# Patient Record
Sex: FEMALE | Race: WHITE | Employment: UNEMPLOYED | URBAN - METROPOLITAN AREA
[De-identification: names, ages, dates, MRNs, and addresses within clinical notes are randomized per-mention and may not be internally consistent; named-entity substitution may affect disease eponyms.]

---

## 2017-01-06 DIAGNOSIS — M79.7 FIBROMYALGIA: ICD-10-CM

## 2017-01-09 ENCOUNTER — OFFICE VISIT (OUTPATIENT)
Dept: NEUROLOGY | Age: 37
End: 2017-01-09

## 2017-01-09 ENCOUNTER — HOSPITAL ENCOUNTER (EMERGENCY)
Age: 37
Discharge: HOME | End: 2017-01-09
Attending: EMERGENCY MEDICINE
Payer: MEDICARE

## 2017-01-09 VITALS
TEMPERATURE: 98 F | RESPIRATION RATE: 15 BRPM | OXYGEN SATURATION: 99 % | WEIGHT: 156.53 LBS | HEART RATE: 84 BPM | BODY MASS INDEX: 26.08 KG/M2 | DIASTOLIC BLOOD PRESSURE: 91 MMHG | HEIGHT: 65 IN | SYSTOLIC BLOOD PRESSURE: 135 MMHG

## 2017-01-09 DIAGNOSIS — F32.A DEPRESSION, UNSPECIFIED DEPRESSION TYPE: Primary | ICD-10-CM

## 2017-01-09 PROCEDURE — 90791 PSYCH DIAGNOSTIC EVALUATION: CPT

## 2017-01-09 PROCEDURE — 99284 EMERGENCY DEPT VISIT MOD MDM: CPT

## 2017-01-09 RX ORDER — IBUPROFEN 800 MG/1
TABLET ORAL
Qty: 90 TAB | Refills: 1 | OUTPATIENT
Start: 2017-01-09

## 2017-01-09 NOTE — DISCHARGE INSTRUCTIONS
Depression and Chronic Disease: Care Instructions  Your Care Instructions  A chronic disease is one that you have for a long time. Some chronic diseases can be controlled, but they usually cannot be cured. Depression is common in people with chronic diseases, but it often goes unnoticed. Many people have concerns about seeking treatment for a mental health problem. You may think it's a sign of weakness, or you don't want people to know about it. It's important to overcome these reasons for not seeking treatment. Treating depression or anxiety is good for your health. Follow-up care is a key part of your treatment and safety. Be sure to make and go to all appointments, and call your doctor if you are having problems. It's also a good idea to know your test results and keep a list of the medicines you take. How can you care for yourself at home? Watch for symptoms of depression  The symptoms of depression are often subtle at first. You may think they are caused by your disease rather than depression. Or you may think it is normal to be depressed when you have a chronic disease. If you are depressed you may:  · Feel sad or hopeless. · Feel guilty or worthless. · Not enjoy the things you used to enjoy. · Feel hopeless, as though life is not worth living. · Have trouble thinking or remembering. · Have low energy, and you may not eat or sleep well. · Pull away from others. · Think often about death or killing yourself. (Keep the numbers for these national suicide hotlines: 8-590-242-TALK [1-848.291.8947] and 5-510-FWHHZLD [1-349.466.5325]. )  Get treatment  By treating your depression, you can feel more hopeful and have more energy. If you feel better, you may take better care of yourself, so your health may improve. · Talk to your doctor if you have any changes in mood during treatment for your disease. · Ask your doctor for help.  Counseling, antidepressant medicine, or a combination of the two can help most people with depression. Often a combination works best. Counseling can also help you cope with having a chronic disease. When should you call for help? Call 911 anytime you think you may need emergency care. For example, call if:  · You feel like hurting yourself or someone else. · Someone you know has depression and is about to attempt or is attempting suicide. Call your doctor now or seek immediate medical care if:  · You hear voices. · Someone you know has depression and:  ¨ Starts to give away his or her possessions. ¨ Uses illegal drugs or drinks alcohol heavily. ¨ Talks or writes about death, including writing suicide notes or talking about guns, knives, or pills. ¨ Starts to spend a lot of time alone. ¨ Acts very aggressively or suddenly appears calm. Watch closely for changes in your health, and be sure to contact your doctor if:  · You do not get better as expected. Where can you learn more? Go to http://curtisZamplecate.info/. Enter R194 in the search box to learn more about \"Depression and Chronic Disease: Care Instructions. \"  Current as of: July 26, 2016  Content Version: 11.1  © 5055-3265 [employer]. Care instructions adapted under license by [employer] (which disclaims liability or warranty for this information). If you have questions about a medical condition or this instruction, always ask your healthcare professional. [place] [address] any warranty or liability for your use of this information. Preventing a Relapse of Depression: Care Instructions  Your Care Instructions  A relapse of depression means your symptoms have come back after you have gotten better. This illness often comes and goes during a lifetime. But there are many things you can do to keep it from coming back. Follow-up care is a key part of your treatment and safety.  Be sure to make and go to all appointments, and call your doctor if you are having problems. It's also a good idea to know your test results and keep a list of the medicines you take. What do you need to know? Know your risk of relapse  Talk to your doctor to find out if you are at risk of relapse. Many things can make a person more likely to relapse into depression. These include having a family member with depression, dealing with serious problems in a relationship or a job, having a serious medical condition, or abusing drugs or alcohol. It is important to know your risk and to recognize warning signs of relapse. Once you know these things, you will be better able to keep it from happening to you. Know the warning signs of relapse  The two most common signs of relapse are:  · Feeling sad or hopeless. · Losing interest in your daily activities. You may have other symptoms, such as:  · You lose or gain weight. · You sleep too much or not enough. · You feel restless and unable to sit still. · You feel unable to move. · You feel tired all the time. · You feel unworthy or guilty without an obvious reason. · You have problems concentrating, remembering, or making decisions. · You think often about death or suicide. · You feel angry or have panic attacks. How can you care for yourself at home? · Take your medicine as prescribed. Call your doctor if you have any problems with your medicine. Many people take their medicines for at least 6 months after they have recovered. This often helps keep symptoms from coming back. However, if your depression keeps coming back, you may have to take medicine for the rest of your life. · Continue counseling even after you have stopped taking medicine. · Eat healthy foods. Include fruits, vegetables, beans, and whole grains in your diet each day. · Get at least 30 minutes of exercise on most days of the week. Walking is a good choice.  You also may want to do other activities, such as running, swimming, cycling, or playing tennis or team sports. · See your doctor right away if you have new symptoms or feel that your depression is coming back. · Keep a regular sleep schedule. Try for 8 hours of sleep a night. · Avoid alcohol and illegal drugs. · Keep the numbers for these national suicide hotlines: 8-744-173-TALK (3-949.168.1412) and 3-569-JKCPVJZ (5-740.211.4693). If you or someone you know talks about suicide or feeling hopeless, get help right away. When should you call for help? Call 911 anytime you think you may need emergency care. For example, call if:  · You are thinking about suicide or are threatening suicide. · You feel you cannot stop from hurting yourself or someone else. · You hear or see things that aren't real.  · You think or speak in a bizarre way that is not like your usual behavior. Call your doctor now or seek immediate medical care if:  · You are drinking a lot of alcohol or using illegal drugs. · You are talking or writing about death. Watch closely for changes in your health, and be sure to contact your doctor if:  · You find it hard or it's getting harder to deal with school, a job, family, or friends. · You think your treatment is not helping or you are not getting better. · Your symptoms get worse or you get new symptoms. · You have any problems with your antidepressant medicines, such as side effects, or you are thinking about stopping your medicine. · You are having manic behavior, such as having very high energy, needing less sleep than normal, or showing risky behavior such as spending money you don't have or abusing others verbally or physically. Where can you learn more? Go to http://curtis-cate.info/. Enter Q929 in the search box to learn more about \"Preventing a Relapse of Depression: Care Instructions. \"  Current as of: July 26, 2016  Content Version: 11.1  © 5075-8294 [employer].  Care instructions adapted under license by [employer] (which disclaims liability or warranty for this information). If you have questions about a medical condition or this instruction, always ask your healthcare professional. [address] any warranty or liability for your use of this information. Recovering From Depression: Care Instructions  Your Care Instructions  Taking good care of yourself is important as you recover from depression. In time, your symptoms will fade as your treatment takes hold. Do not give up. Instead, focus your energy on getting better. Your mood will improve. It just takes some time. Focus on things that can help you feel better, such as being with friends and family, eating well, and getting enough rest. But take things slowly. Do not do too much too soon. You will begin to feel better gradually. Follow-up care is a key part of your treatment and safety. Be sure to make and go to all appointments, and call your doctor if you are having problems. It's also a good idea to know your test results and keep a list of the medicines you take. How can you care for yourself at home? Be realistic  · If you have a large task to do, break it up into smaller steps you can handle, and just do what you can. · You may want to put off important decisions until your depression has lifted. If you have plans that will have a major impact on your life, such as marriage, divorce, or a job change, try to wait a bit. Talk it over with friends and loved ones who can help you look at the overall picture first.  · Reaching out to people for help is important. Do not isolate yourself. Let your family and friends help you. Find someone you can trust and confide in, and talk to that person. · Be patient, and be kind to yourself. Remember that depression is not your fault and is not something you can overcome with willpower alone. Treatment is necessary for depression, just like for any other illness.  Feeling better takes time, and your mood will improve little by little. Stay active  · Stay busy and get outside. Take a walk, or try some other light exercise. · Talk with your doctor about an exercise program. Exercise can help with mild depression. · Go to a movie or concert. Take part in a Christianity activity or other social gathering. Go to a ball game. · Ask a friend to have dinner with you. Take care of yourself  · Eat a balanced diet with plenty of fresh fruits and vegetables, whole grains, and lean protein. If you have lost your appetite, eat small snacks rather than large meals. · Avoid drinking alcohol or using illegal drugs. Do not take medicines that have not been prescribed for you. They may interfere with medicines you may be taking for depression, or they may make your depression worse. · Take your medicines exactly as they are prescribed. You may start to feel better within 1 to 3 weeks of taking antidepressant medicine. But it can take as many as 6 to 8 weeks to see more improvement. If you have questions or concerns about your medicines, or if you do not notice any improvement by 3 weeks, talk to your doctor. · If you have any side effects from your medicine, tell your doctor. Antidepressants can make you feel tired, dizzy, or nervous. Some people have dry mouth, constipation, headaches, sexual problems, or diarrhea. Many of these side effects are mild and will go away on their own after you have been taking the medicine for a few weeks. Some may last longer. Talk to your doctor if side effects are bothering you too much. You might be able to try a different medicine. · Get enough sleep. If you have problems sleeping:  ¨ Go to bed at the same time every night, and get up at the same time every morning. ¨ Keep your bedroom dark and quiet. ¨ Do not exercise after 5:00 p.m. ¨ Avoid drinks with caffeine after 5:00 p.m. · Avoid sleeping pills unless they are prescribed by the doctor treating your depression.  Sleeping pills may make you groggy during the day, and they may interact with other medicine you are taking. · If you have any other illnesses, such as diabetes, heart disease, or high blood pressure, make sure to continue with your treatment. Tell your doctor about all of the medicines you take, including those with or without a prescription. · Keep the numbers for these national suicide hotlines: 0-969-144-TALK (0-449.463.8711) and 9-494-LQGYPPY (6-970.426.9581). If you or someone you know talks about suicide or feeling hopeless, get help right away. When should you call for help? Call 911 anytime you think you may need emergency care. For example, call if:  · You feel like hurting yourself or someone else. · Someone you know has depression and is about to attempt or is attempting suicide. Call your doctor now or seek immediate medical care if:  · You hear voices. · Someone you know has depression and:  ¨ Starts to give away his or her possessions. ¨ Uses illegal drugs or drinks alcohol heavily. ¨ Talks or writes about death, including writing suicide notes or talking about guns, knives, or pills. ¨ Starts to spend a lot of time alone. ¨ Acts very aggressively or suddenly appears calm. Watch closely for changes in your health, and be sure to contact your doctor if:  · You do not get better as expected. Where can you learn more? Go to http://curtis-cate.info/. Enter S350 in the search box to learn more about \"Recovering From Depression: Care Instructions. \"  Current as of: July 26, 2016  Content Version: 11.1  © 9221-4480 [employer]. Care instructions adapted under license by [employer] (which disclaims liability or warranty for this information). If you have questions about a medical condition or this instruction, always ask your healthcare professional. [address] any warranty or liability for your use of this information.          We hope that we have addressed all of your medical concerns. The examination and treatment you received in the Emergency Department were for an emergent problem and were not intended as complete care. It is important that you follow up with your healthcare provider(s) for ongoing care. If your symptoms worsen or do not improve as expected, and you are unable to reach your usual health care provider(s), you should return to the Emergency Department. Today's healthcare is undergoing tremendous change, and patient satisfaction surveys are one of the many tools to assess the quality of medical care. You may receive a survey from the [employer] regarding your experience in the Emergency Department. I hope that your experience has been completely positive, particularly the medical care that I provided. As such, please participate in the survey; anything less than excellent does not meet my expectations or intentions. [facility] and [address] [place] [address] participate in nationally recognized quality of care measures. If your blood pressure is greater than 120/80, as reported below, we urge that you seek medical care to address the potential of high blood pressure, commonly known as hypertension. Hypertension can be hereditary or can be caused by certain medical conditions, pain, stress, or \"white coat syndrome. \"       Please make an appointment with your health care provider(s) for follow up of your Emergency Department visit. VITALS:   Patient Vitals for the past 8 hrs:   Temp Pulse Resp BP SpO2   01/09/17 1536 - 75 - 120/83 -   01/09/17 1502 98 °F (36.7 °C) 89 18 139/87 99 %          Thank you for allowing us to provide you with medical care today. We realize that you have many choices for your emergency care needs. Please choose us in the future for any continued health care needs. Domenica Mclain NP    [employer]   Office: [phone]            No results found for this or any previous visit (from the past 24 hour(s)). No results found.

## 2017-01-09 NOTE — ED NOTES
Pt resting comfortably at this time. Awaiting Ghaad from [facility]. The pt voices no complaints at this time.

## 2017-01-09 NOTE — ED NOTES
The pt has been calm and cooperative. Pt has received follow-up plans from [address] [facility]. The pt is discharged home with parents. Pt denies suicidal and homicidal ideations.
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
## 2017-01-09 NOTE — ED NOTES
Pt placed in room 5 for direct observation. The pt and family (mother and father) report no suicidal issues at this time. Family at bedside.

## 2017-01-11 ENCOUNTER — OFFICE VISIT (OUTPATIENT)
Dept: INTERNAL MEDICINE CLINIC | Age: 37
End: 2017-01-11

## 2017-01-11 ENCOUNTER — DOCUMENTATION ONLY (OUTPATIENT)
Dept: INTERNAL MEDICINE CLINIC | Age: 37
End: 2017-01-11

## 2017-01-11 VITALS
OXYGEN SATURATION: 98 % | SYSTOLIC BLOOD PRESSURE: 132 MMHG | BODY MASS INDEX: 25.72 KG/M2 | HEART RATE: 83 BPM | TEMPERATURE: 98.8 F | WEIGHT: 154.4 LBS | RESPIRATION RATE: 20 BRPM | HEIGHT: 65 IN | DIASTOLIC BLOOD PRESSURE: 91 MMHG

## 2017-01-11 DIAGNOSIS — S06.9X0S TRAUMATIC BRAIN INJURY WITHOUT LOSS OF CONSCIOUSNESS, SEQUELA (HCC): Primary | ICD-10-CM

## 2017-01-11 DIAGNOSIS — F19.20 POLYSUBSTANCE DEPENDENCE (HCC): ICD-10-CM

## 2017-01-11 DIAGNOSIS — F25.0 SCHIZOAFFECTIVE DISORDER, MANIC TYPE (HCC): ICD-10-CM

## 2017-01-11 RX ORDER — SUMATRIPTAN 25 MG/1
TABLET, FILM COATED ORAL
Refills: 0 | COMMUNITY
Start: 2017-01-06 | End: 2018-09-25 | Stop reason: SDUPTHER

## 2017-01-11 RX ORDER — CYCLOBENZAPRINE HCL 10 MG
TABLET ORAL
Refills: 0 | COMMUNITY
Start: 2017-01-05 | End: 2019-09-30 | Stop reason: SDUPTHER

## 2017-01-11 NOTE — PROGRESS NOTES
HPI   Beather Barthel is a 44 y.o. female, she presents today for:    Prior patient seen by Dr. Ted Cruz.   Presents in follow-up to clinic after going to ER in place of neurology appointment on 1/9. Parents with concerns regarding risky behavior. History of hospitlaliztion. Not currently following with psychiatrist. In July of this year was advised to make this follow-up    Last admitted in May 2016 diagnosed with schizoaffective disorder  Referred to Dr. Baljinder Caal at [address] [place] [address] at that time  Discharged on depakote and risperdal.     Today in office mother reports she has been living with roommate, but wandering/walking outside without shoes on. Gloria Carlisle denies this. Gloria Dire states that she feels fuzzy in the head, thinks it may be related to her TBI. Also states she feels fine. Expresses desire to be independent in actions and not be confined to inside of the house. She is frustrated that her mother thinks there is anything wrong with her.    - has cutback/stopped smoking.    - she does admit that her mind is not where she would like to be be. PMH/PSH: reviewed and updated  Sochx:  reports that she has quit smoking. Her smoking use included Cigarettes. She does not have any smokeless tobacco history on file. She reports that she drinks alcohol. Famhx: reviewed and updated     All: Allergies   Allergen Reactions    Codeine Itching     Med:   Current Outpatient Prescriptions   Medication Sig    cyclobenzaprine (FLEXERIL) 10 mg tablet     SUMAtriptan (IMITREX) 25 mg tablet     ibuprofen (MOTRIN) 800 mg tablet take 1 tablet by mouth three times a day     No current facility-administered medications for this visit. Review of Systems   Constitutional: Negative for chills and fever. Respiratory: Negative for cough. Cardiovascular: Negative for chest pain. Gastrointestinal: Negative for nausea and vomiting. Skin: Negative for rash.    Neurological: Negative for headaches. Psychiatric/Behavioral: Negative for substance abuse and suicidal ideas. The patient has insomnia. PE:  Blood pressure (!) 132/91, pulse 83, temperature 98.8 °F (37.1 °C), temperature source Oral, resp. rate 20, height 5' 5\" (1.651 m), weight 154 lb 6.4 oz (70 kg), last menstrual period 12/26/2016, SpO2 98 %. Body mass index is 25.69 kg/(m^2). Physical Exam   Constitutional: She appears well-developed and well-nourished. HENT:   Head: Normocephalic. Right Ear: External ear normal.   Left Ear: External ear normal.   Eyes: Conjunctivae are normal. Pupils are equal, round, and reactive to light. Neck: Normal range of motion. Neck supple. No thyromegaly present. Cardiovascular: Normal rate and regular rhythm. Pulmonary/Chest: Effort normal and breath sounds normal.   Skin:   Skin on feet is healthy appearing, mild peeling around toes   Psychiatric:   Flattened affect, answering some questions. Nursing note and vitals reviewed. A/P:  44 y.o. female    ICD-10-CM ICD-9-CM    1. Traumatic brain injury without loss of consciousness, sequela ([facility] S06.9X0S 907.0    2. Polysubstance dependence ([address] F19.20 304.80    3. Schizoaffective disorder, manic type ([address] F25.0 295.70      Patient off medications for some time. With flattened affect, but no psychomotor agitation. With supportive family, no immediate harm to self. Tried to help patient frame seeking psychiatric care in a positive growth light and not just something her mother requires. - discussed that I would not want to restart a new medication regimen   - to follow-up with [facility] in the AM (lives in [place] [address]). - if behaviors become concerning for safety, to go to ER or call 911.      Appreciate assistance of CSW  30 minutes time spent with >50% in counseling and/or coordination of care
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
## 2017-01-11 NOTE — PROGRESS NOTES
Clinician met with patient regarding follow up on finding a psychiatric provider. Patient was taking her Depakote and mother reports that her symptoms improved. Patient reports that she did not feel any different and had negative GI side effects. Patient is now living independently and parents are concerned about patients mental health. Patient has an appointment with Gerardo Lehman in the morning in order to establish services. Clinician gave patient a list of psychiatric providers.

## 2017-01-17 DIAGNOSIS — M79.7 FIBROMYALGIA: ICD-10-CM

## 2017-01-17 RX ORDER — IBUPROFEN 800 MG/1
TABLET ORAL
Qty: 90 TAB | Refills: 1 | Status: CANCELLED | OUTPATIENT
Start: 2017-01-17

## 2017-01-17 RX ORDER — IBUPROFEN 600 MG/1
600 TABLET ORAL
Qty: 30 TAB | Refills: 0 | Status: SHIPPED | OUTPATIENT
Start: 2017-01-17 | End: 2017-03-20 | Stop reason: SDUPTHER

## 2017-04-05 ENCOUNTER — OFFICE VISIT (OUTPATIENT)
Dept: INTERNAL MEDICINE CLINIC | Age: 37
End: 2017-04-05

## 2017-04-05 ENCOUNTER — HOSPITAL ENCOUNTER (OUTPATIENT)
Dept: LAB | Age: 37
Discharge: HOME | End: 2017-04-05
Payer: MEDICARE

## 2017-04-05 VITALS
RESPIRATION RATE: 17 BRPM | SYSTOLIC BLOOD PRESSURE: 116 MMHG | HEART RATE: 82 BPM | WEIGHT: 158.2 LBS | OXYGEN SATURATION: 97 % | BODY MASS INDEX: 26.36 KG/M2 | DIASTOLIC BLOOD PRESSURE: 66 MMHG | HEIGHT: 65 IN | TEMPERATURE: 98.2 F

## 2017-04-05 DIAGNOSIS — S06.9X0S TRAUMATIC BRAIN INJURY WITHOUT LOSS OF CONSCIOUSNESS, SEQUELA (HCC): ICD-10-CM

## 2017-04-05 DIAGNOSIS — M54.2 CHRONIC NECK PAIN: Primary | ICD-10-CM

## 2017-04-05 DIAGNOSIS — F19.20 POLYSUBSTANCE DEPENDENCE (HCC): ICD-10-CM

## 2017-04-05 DIAGNOSIS — Z79.1 NSAID LONG-TERM USE: ICD-10-CM

## 2017-04-05 DIAGNOSIS — G89.29 CHRONIC NECK PAIN: Primary | ICD-10-CM

## 2017-04-05 DIAGNOSIS — M79.7 FIBROMYALGIA: ICD-10-CM

## 2017-04-05 DIAGNOSIS — F25.0 SCHIZOAFFECTIVE DISORDER, MANIC TYPE (HCC): ICD-10-CM

## 2017-04-05 PROCEDURE — 80048 BASIC METABOLIC PNL TOTAL CA: CPT

## 2017-04-05 RX ORDER — PANTOPRAZOLE SODIUM 20 MG/1
20 TABLET, DELAYED RELEASE ORAL DAILY
Qty: 60 TAB | Refills: 5 | Status: ON HOLD | OUTPATIENT
Start: 2017-04-05 | End: 2020-08-10

## 2017-04-05 RX ORDER — IBUPROFEN 600 MG/1
600 TABLET ORAL
Qty: 30 TAB | Refills: 1 | Status: SHIPPED | OUTPATIENT
Start: 2017-04-05 | End: 2018-09-26 | Stop reason: SDUPTHER

## 2017-04-05 NOTE — PROGRESS NOTES
RM 2    Chief Complaint   Patient presents with    Follow-up     medication    Physical    Medication Refill     PHQ 2 / 9, over the last two weeks 4/5/2017   Little interest or pleasure in doing things Not at all   Feeling down, depressed or hopeless Not at all   Total Score PHQ 2 0

## 2017-04-05 NOTE — PATIENT INSTRUCTIONS
Learning About How to Have a Healthy Back  What causes back pain? Back pain is often caused by overuse, strain, or injury. For example, people often hurt their backs playing sports or working in the yard, being jolted in a car accident, or lifting something too heavy. Aging plays a part too. Your bones and muscles tend to lose strength as you age, which makes injury more likely. The spongy discs between the bones of the spine (vertebrae) may suffer from wear and tear and no longer provide enough cushion between the bones. A disc that bulges or breaks open (herniated disc) can press on nerves, causing back pain. In some people, back pain is the result of arthritis, broken vertebrae caused by bone loss (osteoporosis), illness, or a spine problem. Although most people have back pain at one time or another, there are steps you can take to make it less likely. How can you have a healthy back? Reduce stress on your back through good posture  Slumping or slouching alone may not cause low back pain. But after the back has been strained or injured, bad posture can make pain worse. · Sleep in a position that maintains your back's normal curves and on a mattress that feels comfortable. Sleep on your side with a pillow between your knees, or sleep on your back with a pillow under your knees. These positions can reduce strain on your back. · Stand and sit up straight. \"Good posture\" generally means your ears, shoulders, and hips are in a straight line. · If you must stand for a long time, put one foot on a stool, ledge, or box. Switch feet every now and then. · Sit in a chair that is low enough to let you place both feet flat on the floor with both knees nearly level with your hips. If your chair or desk is too high, use a footrest to raise your knees. Place a small pillow, a rolled-up towel, or a lumbar roll in the curve of your back if you need extra support.   · Try a kneeling chair, which helps tilt your hips forward. This takes pressure off your lower back. · Try sitting on an exercise ball. It can rock from side to side, which helps keep your back loose. · When driving, keep your knees nearly level with your hips. Sit straight, and drive with both hands on the steering wheel. Your arms should be in a slightly bent position. Reduce stress on your back through careful lifting  · Squat down, bending at the hips and knees only. If you need to, put one knee to the floor and extend your other knee in front of you, bent at a right angle (half kneeling). · Press your chest straight forward. This helps keep your upper back straight while keeping a slight arch in your low back. · Hold the load as close to your body as possible, at the level of your belly button (navel). · Use your feet to change direction, taking small steps. · Lead with your hips as you change direction. Keep your shoulders in line with your hips as you move. · Set down your load carefully, squatting with your knees and hips only. Exercise and stretch your back  · Do some exercise on most days of the week, if your doctor says it is okay. You can walk, run, swim, or cycle. · Stretch your back muscles. Here are a few exercises to try:  Mila Gunning on your back, and gently pull one bent knee to your chest. Put that foot back on the floor, and then pull the other knee to your chest.  ¨ Do pelvic tilts. Lie on your back with your knees bent. Tighten your stomach muscles. Pull your belly button (navel) in and up toward your ribs. You should feel like your back is pressing to the floor and your hips and pelvis are slightly lifting off the floor. Hold for 6 seconds while breathing smoothly. ¨ Sit with your back flat against a wall. · Keep your core muscles strong. The muscles of your back, belly (abdomen), and buttocks support your spine. ¨ Pull in your belly and imagine pulling your navel toward your spine. Hold this for 6 seconds, then relax.  Remember to keep breathing normally as you tense your muscles. ¨ Do curl-ups. Always do them with your knees bent. Keep your low back on the floor, and curl your shoulders toward your knees using a smooth, slow motion. Keep your arms folded across your chest. If this bothers your neck, try putting your hands behind your neck (not your head), with your elbows spread apart. ¨ Lie on your back with your knees bent and your feet flat on the floor. Tighten your belly muscles, and then push with your feet and raise your buttocks up a few inches. Hold this position 6 seconds as you continue to breathe normally, then lower yourself slowly to the floor. Repeat 8 to 12 times. ¨ If you like group exercise, try Pilates or yoga. These classes have poses that strengthen the core muscles. Lead a healthy lifestyle  · Stay at a healthy weight to avoid strain on your back. · Do not smoke. Smoking increases the risk of osteoporosis, which weakens the spine. If you need help quitting, talk to your doctor about stop-smoking programs and medicines. These can increase your chances of quitting for good. Where can you learn more? Go to http://curtis-cate.info/. Enter L315 in the search box to learn more about \"Learning About How to Have a Healthy Back. \"  Current as of: May 23, 2016  Content Version: 11.2  © 2027-2367 [employer]. Care instructions adapted under license by [employer] (which disclaims liability or warranty for this information). If you have questions about a medical condition or this instruction, always ask your healthcare professional. [place] [address] any warranty or liability for your use of this information.
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
## 2017-04-05 NOTE — MR AVS SNAPSHOT
Visit Information Date & Time Provider Department Dept. Phone Encounter #  
 4/5/2017  1:30 PM Lashae Caraballo MD [facility] and Internal [facility] [phone] [id number] Follow-up Instructions Return in about 1 month (around 5/5/2017). Upcoming Health Maintenance Date Due DTaP/Tdap/Td series (1 - Tdap) 1/5/2000 PAP AKA CERVICAL CYTOLOGY 1/5/2000 INFLUENZA AGE 9 TO ADULT 8/1/2016 Allergies as of 4/5/2017  Review Complete On: 4/5/2017 By: Rebecca Hussein Severity Noted Reaction Type Reactions Codeine  08/25/2015    Itching Current Immunizations  Never Reviewed No immunizations on file. Not reviewed this visit You Were Diagnosed With   
  
 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 
ICD-9-CM: 907.0 Fibromyalgia     ICD-10-CM: M79.7 ICD-9-CM: 729.1 NSAID long-term use     ICD-10-CM: Z79.1 ICD-9-CM: V58.64 Vitals BP Pulse Temp Resp Height(growth percentile) Weight(growth percentile) 116/66 82 98.2 °F (36.8 °C) (Oral) 17 5' 5\" (1.651 m) 158 lb 3.2 oz (71.8 kg) LMP SpO2 BMI OB Status Smoking Status 03/25/2017 97% 26.33 kg/m2 Having regular periods Former Smoker Vitals History BMI and BSA Data Body Mass Index Body Surface Area  
 26.33 kg/m 2 1.81 m 2 Preferred Pharmacy Pharmacy Name Phone [facility] - Sylvia Mendoza, [address] [place] [address] Judy Marsh [phone] Your Updated Medication List  
  
   
This list is accurate as of: 4/5/17  2:31 PM.  Always use your most recent med list.  
  
  
  
  
 ARIPiprazole 2 mg tablet Commonly known as:  ABILIFY  
take 1 tablet by mouth every morning  
  
 cyclobenzaprine 10 mg tablet Commonly known as:  FLEXERIL  
  
 ibuprofen 600 mg tablet Commonly known as:  MOTRIN  
 Take 1 Tab by mouth every six (6) hours as needed for Pain.  
  
 pantoprazole 20 mg tablet Commonly known as:  PROTONIX Take 1 Tab by mouth daily. SUMAtriptan 25 mg tablet Commonly known as:  IMITREX Prescriptions Sent to Pharmacy Refills  
 ibuprofen (MOTRIN) 600 mg tablet 1 Sig: Take 1 Tab by mouth every six (6) hours as needed for Pain. Class: Normal  
 Pharmacy: [address], [address] [address]  #: [phone] Route: Oral  
 pantoprazole (PROTONIX) 20 mg tablet 5 Sig: Take 1 Tab by mouth daily. Class: Normal  
 Pharmacy: [address], [address] Ph #: [phone] Route: Oral  
  
We Performed the Following METABOLIC PANEL, BASIC [59637 CPT(R)] REFERRAL TO PHYSICIAL MEDICINE REHAB [KNC30 Custom] Comments:  
 Please evaluate patient for chronic neck pain with numbness in hands. Follow-up Instructions Return in about 1 month (around 5/5/2017). Referral Information Referral ID Referred By Referred To  
  
 [id number] Paula Lee MD   
   [facility], [address] [place] [address] Avenue Phone: [phone] Fax: [phone] Visits Status Start Date End Date 1 New Request 4/5/17 4/5/18 If your referral has a status of pending review or denied, additional information will be sent to support the outcome of this decision. Patient Instructions Learning About How to Have a Healthy Back What causes back pain? Back pain is often caused by overuse, strain, or injury. For example, people often hurt their backs playing sports or working in the yard, being jolted in a car accident, or lifting something too heavy. Aging plays a part too. Your bones and muscles tend to lose strength as you age, which makes injury more likely.  The spongy discs between the bones of the spine (vertebrae) may suffer from wear and tear and no longer provide enough cushion between the bones. A disc that bulges or breaks open (herniated disc) can press on nerves, causing back pain. In some people, back pain is the result of arthritis, broken vertebrae caused by bone loss (osteoporosis), illness, or a spine problem. Although most people have back pain at one time or another, there are steps you can take to make it less likely. How can you have a healthy back? Reduce stress on your back through good posture Slumping or slouching alone may not cause low back pain. But after the back has been strained or injured, bad posture can make pain worse. · Sleep in a position that maintains your back's normal curves and on a mattress that feels comfortable. Sleep on your side with a pillow between your knees, or sleep on your back with a pillow under your knees. These positions can reduce strain on your back. · Stand and sit up straight. \"Good posture\" generally means your ears, shoulders, and hips are in a straight line. · If you must stand for a long time, put one foot on a stool, ledge, or box. Switch feet every now and then. · Sit in a chair that is low enough to let you place both feet flat on the floor with both knees nearly level with your hips. If your chair or desk is too high, use a footrest to raise your knees. Place a small pillow, a rolled-up towel, or a lumbar roll in the curve of your back if you need extra support. · Try a kneeling chair, which helps tilt your hips forward. This takes pressure off your lower back. · Try sitting on an exercise ball. It can rock from side to side, which helps keep your back loose. · When driving, keep your knees nearly level with your hips. Sit straight, and drive with both hands on the steering wheel. Your arms should be in a slightly bent position. Reduce stress on your back through careful lifting · Squat down, bending at the hips and knees only. If you need to, put one knee to the floor and extend your other knee in front of you, bent at a right angle (half kneeling). · Press your chest straight forward. This helps keep your upper back straight while keeping a slight arch in your low back. · Hold the load as close to your body as possible, at the level of your belly button (navel). · Use your feet to change direction, taking small steps. · Lead with your hips as you change direction. Keep your shoulders in line with your hips as you move. · Set down your load carefully, squatting with your knees and hips only. Exercise and stretch your back · Do some exercise on most days of the week, if your doctor says it is okay. You can walk, run, swim, or cycle. · Stretch your back muscles. Here are a few exercises to try: ¨ Lie on your back, and gently pull one bent knee to your chest. Put that foot back on the floor, and then pull the other knee to your chest. 
¨ Do pelvic tilts. Lie on your back with your knees bent. Tighten your stomach muscles. Pull your belly button (navel) in and up toward your ribs. You should feel like your back is pressing to the floor and your hips and pelvis are slightly lifting off the floor. Hold for 6 seconds while breathing smoothly. ¨ Sit with your back flat against a wall. · Keep your core muscles strong. The muscles of your back, belly (abdomen), and buttocks support your spine. ¨ Pull in your belly and imagine pulling your navel toward your spine. Hold this for 6 seconds, then relax. Remember to keep breathing normally as you tense your muscles. ¨ Do curl-ups. Always do them with your knees bent. Keep your low back on the floor, and curl your shoulders toward your knees using a smooth, slow motion. Keep your arms folded across your chest. If this bothers your neck, try putting your hands behind your neck (not your head), with your elbows spread apart. ¨ Lie on your back with your knees bent and your feet flat on the floor. Tighten your belly muscles, and then push with your feet and raise your buttocks up a few inches. Hold this position 6 seconds as you continue to breathe normally, then lower yourself slowly to the floor. Repeat 8 to 12 times. ¨ If you like group exercise, try Pilates or yoga. These classes have poses that strengthen the core muscles. Lead a healthy lifestyle · Stay at a healthy weight to avoid strain on your back. · Do not smoke. Smoking increases the risk of osteoporosis, which weakens the spine. If you need help quitting, talk to your doctor about stop-smoking programs and medicines. These can increase your chances of quitting for good. Where can you learn more? Go to http://curtis-cate.info/. Enter L315 in the search box to learn more about \"Learning About How to Have a Healthy Back. \" Current as of: May 23, 2016 Content Version: 11.2 © 9622-6085 [employer]. Care instructions adapted under license by [employer] (which disclaims liability or warranty for this information). If you have questions about a medical condition or this instruction, always ask your healthcare professional. [address] any warranty or liability for your use of this information. Introducing [facility]! Ahsan Martinez introduces Shareaholic patient portal. Now you can access parts of your medical record, email your doctor's office, and request medication refills online. 1. In your internet browser, go to [email or web address] 2. Click on the First Time User? Click Here link in the Sign In box. You will see the New Member Sign Up page. 3. Enter your Shareaholic Access Code exactly as it appears below. You will not need to use this code after youve completed the sign-up process. If you do not sign up before the expiration date, you must request a new code. · Soma Water Access Code: [email or web address] Expires: 4/9/2017  3:20 PM 
 
4. Enter the last four digits of your Social Security Number (xxxx) and Date of Birth (mm/dd/yyyy) as indicated and click Submit. You will be taken to the next sign-up page. 5. Create a Soma Water ID. This will be your S[employer] Water login ID and cannot be changed, so think of one that is secure and easy to remember. 6. Create a Soma Water password. You can change your password at any time. 7. Enter your Password Reset Question and Answer. This can be used at a later time if you forget your password. 8. Enter your e-mail address. You will receive e-mail notification when new information is available in [address] 9. Click Sign Up. You can now view and download portions of your medical record. 10. Click the Download Summary menu link to download a portable copy of your medical information. If you have questions, please visit the Frequently Asked Questions section of the S[employer] website. Remember, S[employer] Water is NOT to be used for urgent needs. For medical emergencies, dial 911. Now available from your iPhone and Android! Please provide this summary of care documentation to your next provider. Your primary care clinician is listed as Rolo Bazan. If you have any questions after today's visit, please call [phone].

## 2017-04-06 LAB
BUN SERPL-MCNC: 11 MG/DL (ref 6–20)
BUN/CREAT SERPL: 15 (ref 9–23)
CALCIUM SERPL-MCNC: 9.3 MG/DL (ref 8.7–10.2)
CHLORIDE SERPL-SCNC: 100 MMOL/L (ref 96–106)
CO2 SERPL-SCNC: 24 MMOL/L (ref 18–29)
CREAT SERPL-MCNC: 0.74 MG/DL (ref 0.57–1)
GLUCOSE SERPL-MCNC: 86 MG/DL (ref 65–99)
POTASSIUM SERPL-SCNC: 4.1 MMOL/L (ref 3.5–5.2)
SODIUM SERPL-SCNC: 141 MMOL/L (ref 134–144)

## 2017-04-07 RX ORDER — CYCLOBENZAPRINE HCL 10 MG
TABLET ORAL
Qty: 60 TAB | Refills: 2 | OUTPATIENT
Start: 2017-04-07

## 2017-07-13 ENCOUNTER — TELEPHONE (OUTPATIENT)
Dept: INTERNAL MEDICINE CLINIC | Age: 37
End: 2017-07-13

## 2018-09-04 DIAGNOSIS — G89.29 CHRONIC NECK PAIN: ICD-10-CM

## 2018-09-04 DIAGNOSIS — Z79.1 NSAID LONG-TERM USE: ICD-10-CM

## 2018-09-04 DIAGNOSIS — M79.7 FIBROMYALGIA: ICD-10-CM

## 2018-09-04 DIAGNOSIS — M54.2 CHRONIC NECK PAIN: ICD-10-CM

## 2018-09-06 RX ORDER — IBUPROFEN 600 MG/1
TABLET ORAL
Qty: 30 TAB | Refills: 0 | OUTPATIENT
Start: 2018-09-06

## 2018-09-06 RX ORDER — PANTOPRAZOLE SODIUM 20 MG/1
TABLET, DELAYED RELEASE ORAL
Qty: 60 TAB | Refills: 0 | OUTPATIENT
Start: 2018-09-06

## 2018-09-06 NOTE — TELEPHONE ENCOUNTER
Refill for ibuprofen and pantoprazole declined. Both available OTC, patient not seen since January 2017. Will need to call office for appointment.      Luh Myles MD

## 2018-09-25 ENCOUNTER — OFFICE VISIT (OUTPATIENT)
Dept: INTERNAL MEDICINE CLINIC | Age: 38
End: 2018-09-25

## 2018-09-25 VITALS
RESPIRATION RATE: 16 BRPM | HEART RATE: 88 BPM | WEIGHT: 143.8 LBS | OXYGEN SATURATION: 97 % | SYSTOLIC BLOOD PRESSURE: 119 MMHG | BODY MASS INDEX: 23.96 KG/M2 | HEIGHT: 65 IN | DIASTOLIC BLOOD PRESSURE: 84 MMHG | TEMPERATURE: 98.4 F

## 2018-09-25 DIAGNOSIS — R05.9 COUGH: Primary | ICD-10-CM

## 2018-09-25 DIAGNOSIS — Z23 ENCOUNTER FOR IMMUNIZATION: ICD-10-CM

## 2018-09-25 DIAGNOSIS — G43.809 OTHER MIGRAINE WITHOUT STATUS MIGRAINOSUS, NOT INTRACTABLE: ICD-10-CM

## 2018-09-25 DIAGNOSIS — M79.7 FIBROMYALGIA: ICD-10-CM

## 2018-09-25 DIAGNOSIS — F99 PSYCHIATRIC ILLNESS: ICD-10-CM

## 2018-09-25 DIAGNOSIS — S06.9X0S TRAUMATIC BRAIN INJURY WITHOUT LOSS OF CONSCIOUSNESS, SEQUELA (HCC): ICD-10-CM

## 2018-09-25 DIAGNOSIS — J30.2 SEASONAL ALLERGIC RHINITIS, UNSPECIFIED TRIGGER: ICD-10-CM

## 2018-09-25 RX ORDER — LORATADINE 10 MG/1
10 TABLET ORAL DAILY
Qty: 30 TAB | Refills: 5 | Status: SHIPPED | OUTPATIENT
Start: 2018-09-25

## 2018-09-25 RX ORDER — CYCLOBENZAPRINE HCL 10 MG
5 TABLET ORAL
Qty: 60 TAB | Refills: 2 | Status: SHIPPED | OUTPATIENT
Start: 2018-09-25 | End: 2019-09-30 | Stop reason: SDUPTHER

## 2018-09-25 RX ORDER — NORGESTREL AND ETHINYL ESTRADIOL 0.3-0.03MG
KIT ORAL
Refills: 3 | Status: ON HOLD | COMMUNITY
Start: 2018-09-07 | End: 2020-08-10

## 2018-09-25 RX ORDER — SUMATRIPTAN 50 MG/1
50 TABLET, FILM COATED ORAL
Qty: 9 TAB | Refills: 5 | Status: SHIPPED | OUTPATIENT
Start: 2018-09-25 | End: 2018-09-25
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
## 2018-09-25 NOTE — MR AVS SNAPSHOT
[address]  [address] [place] [address] 
[phone] Patient: Yolanda Johansen MRN: [id number] AZQ:[birth year] Visit Information Date & Time Provider Department Dept. Phone Encounter #  
 9/25/2018  3:00 PM Arnold Quezada, [address] [place] [address] and Internal Medicine [id number] Follow-up Instructions Return in about 4 months (around 1/25/2019), or if symptoms worsen or fail to improve, for psych and neuropsych follow up. Your Appointments 1/9/2019  9:00 AM  
New Patient with Dedrick Garcia MD  
[facility] Appt Note: new pt for evaluation; unsure of what she is currently going through; states that she has traumatic brain injury; pt made appt; told to arrive at 8:30  
 [address] [facility] [address] [place] [address] [facility] [address]  
  
   
 [address] [place] [address] [address] [facility] [address] [address] Upcoming Health Maintenance Date Due DTaP/Tdap/Td series (1 - Tdap) 1/5/2000 PAP AKA CERVICAL CYTOLOGY 1/5/2000 MEDICARE YEARLY EXAM 3/14/2018 Influenza Age 5 to Adult 8/1/2018 Allergies as of 9/25/2018  Review Complete On: 9/25/2018 By: Arnold Quezada MD  
  
 Severity Noted Reaction Type Reactions Codeine  08/25/2015    Itching Current Immunizations  Never Reviewed Name Date Influenza Vaccine (Quad) PF  Incomplete Not reviewed this visit You Were Diagnosed With   
  
 Codes Comments Cough    -  Primary ICD-10-CM: R34 ICD-9-CM: 500. 2 Fibromyalgia     ICD-10-CM: M79.7 ICD-9-CM: 729.1 Traumatic brain injury without loss of consciousness, sequela ([facility]     ICD-10-CM: S06.9X0S 
ICD-9-CM: 907.0 Psychiatric illness     ICD-10-CM: F99 
ICD-9-CM: 300.9 Seasonal allergic rhinitis, unspecified trigger     ICD-10-CM: J30.2 ICD-9-CM: 477.9  Other migraine without status migrainosus, not intractable     ICD-10-CM: D67.549 ICD-9-CM: 346.80 Encounter for immunization     ICD-10-CM: P59 ICD-9-CM: V03.89 Vitals BP Pulse Temp Resp Height(growth percentile) Weight(growth percentile) 119/84 (BP 1 Location: Left arm, BP Patient Position: Sitting) 88 98.4 °F (36.9 °C) (Oral) 16 5' 5\" (1.651 m) 143 lb 12.8 oz (65.2 kg) SpO2 BMI OB Status Smoking Status 97% 23.93 kg/m2 Having regular periods Former Smoker BMI and BSA Data Body Mass Index Body Surface Area  
 23.93 kg/m 2 1.73 m 2 Preferred Pharmacy Pharmacy Name Phone [address] [phone] Your Updated Medication List  
  
   
This list is accurate as of 9/25/18  4:13 PM.  Always use your most recent med list.  
  
  
  
  
 ARIPiprazole 2 mg tablet Commonly known as:  ABILIFY  
take 1 tablet by mouth every morning * cyclobenzaprine 10 mg tablet Commonly known as:  FLEXERIL * cyclobenzaprine 10 mg tablet Commonly known as:  FLEXERIL Take 0.5 Tabs by mouth three (3) times daily as needed for Muscle Spasm(s). guaiFENesin-dextromethorphan -30 mg per tablet Commonly known as:  Chai & Chai DM Take 1 Tab by mouth two (2) times a day. * ADVIL LIQUI-GEL PO Take  by mouth. * ibuprofen 600 mg tablet Commonly known as:  MOTRIN Take 1 Tab by mouth every six (6) hours as needed for Pain.  
  
 loratadine 10 mg tablet Commonly known as:  Michelle Martinet Take 1 Tab by mouth daily. LOW-OGESTREL (28) 0.3-30 mg-mcg Tab Generic drug:  norgestrel-ethinyl estradiol  
  
 pantoprazole 20 mg tablet Commonly known as:  PROTONIX Take 1 Tab by mouth daily. SUMAtriptan 50 mg tablet Commonly known as:  IMITREX Take 1 Tab by mouth once as needed for Migraine for up to 1 dose. VALERIAN ROOT PO Take  by mouth. * Notice: This list has 4 medication(s) that are the same as other medications prescribed for you. Read the directions carefully, and ask your doctor or other care provider to review them with you. Prescriptions Sent to Pharmacy Refills  
 loratadine (CLARITIN) 10 mg tablet 5 Sig: Take 1 Tab by mouth daily. Class: Normal  
 Pharmacy: [facility], [address] [facility] [employer] Ph #: [phone] Route: Oral  
 guaiFENesin-dextromethorphan SR (MUCINEX DM) 600-30 mg per tablet 1 Sig: Take 1 Tab by mouth two (2) times a day. Class: Normal  
 Pharmacy: [facility], [address] [facility] [address] [employer] Ph #: [phone] Route: Oral  
 cyclobenzaprine (FLEXERIL) 10 mg tablet 2 Sig: Take 0.5 Tabs by mouth three (3) times daily as needed for Muscle Spasm(s). Class: Normal  
 Pharmacy: [facility], [address] [facility] [employer] Ph #: [phone] Route: Oral  
 SUMAtriptan (IMITREX) 50 mg tablet 5 Sig: Take 1 Tab by mouth once as needed for Migraine for up to 1 dose. Class: Normal  
 Pharmacy: [facility], [address] [facility] [address] [employer] Ph #: [phone] Route: Oral  
  
We Performed the Following INFLUENZA VIRUS VAC QUAD,SPLIT,PRESV FREE SYRINGE IM [id number] CPT(R)] REFERRAL TO NEUROPSYCHOLOGY [LRL59 Custom] Follow-up Instructions Return in about 4 months (around 1/25/2019), or if symptoms worsen or fail to improve, for psych and neuropsych follow up. Referral Information Referral ID Referred By Referred To  
  
 [id number] Aman PARIS, PHD   
   [facility], [address] Phone: [phone] Fax: [phone] Visits Status Start Date End Date 1 New Request 9/25/18 9/25/19 If your referral has a status of pending review or denied, additional information will be sent to support the outcome of this decision. Introducing [facility]! [employer] introduces WakingApp patient portal. Now you can access parts of your medical record, email your doctor's office, and request medication refills online. 1. In your internet browser, go to [email or web address] 2. Click on the First Time User? Click Here link in the Sign In box. You will see the New Member Sign Up page. 3. Enter your WakingApp Access Code exactly as it appears below. You will not need to use this code after youve completed the sign-up process. If you do not sign up before the expiration date, you must request a new code. · WakingApp Access Code: [email or web address] Expires: 12/24/2018  4:12 PM 
 
4. Enter the last four digits of your Social Security Number (xxxx) and Date of Birth (mm/dd/yyyy) as indicated and click Submit. You will be taken to the next sign-up page. 5. Create a WakingApp ID. This will be your WakingApp login ID and cannot be changed, so think of one that is secure and easy to remember. 6. Create a WakingApp password. You can change your password at any time. 7. Enter your Password Reset Question and Answer. This can be used at a later time if you forget your password. 8. Enter your e-mail address. You will receive e-mail notification when new information is available in [address] 9. Click Sign Up. You can now view and download portions of your medical record. 10. Click the Download Summary menu link to download a portable copy of your medical information. If you have questions, please visit the Frequently Asked Questions section of the WakingApp website. Remember, WakingApp is NOT to be used for urgent needs. For medical emergencies, dial 911. Now available from your iPhone and Android! Please provide this summary of care documentation to your next provider. Your primary care clinician is listed as [address] If you have any questions after today's visit, please call [phone].

## 2018-09-25 NOTE — PROGRESS NOTES
Rm 14 Chief Complaint Patient presents with  Medication Evaluation  
  pt thinks she needs her vyvanse back  Neck Pain  Anxiety 1. Have you been to the ER, urgent care clinic since your last visit? Hospitalized since your last visit? No 
 
2. Have you seen or consulted any other health care providers outside of the [address] [place] [address] since your last visit? Include any pap smears or colon screening. No 
 
Health Maintenance Due Topic Date Due  
 DTaP/Tdap/Td series (1 - Tdap) 01/05/2000  PAP AKA CERVICAL CYTOLOGY  01/05/2000  MEDICARE YEARLY EXAM  03/14/2018  Influenza Age 5 to Adult  08/01/2018 PHQ over the last two weeks 9/25/2018 Little interest or pleasure in doing things Not at all Feeling down, depressed, irritable, or hopeless Not at all Total Score PHQ 2 0

## 2018-09-26 DIAGNOSIS — S06.9X0S TRAUMATIC BRAIN INJURY WITHOUT LOSS OF CONSCIOUSNESS, SEQUELA (HCC): Primary | ICD-10-CM

## 2018-09-26 DIAGNOSIS — Z79.1 NSAID LONG-TERM USE: ICD-10-CM

## 2018-09-26 DIAGNOSIS — M54.2 CHRONIC NECK PAIN: ICD-10-CM

## 2018-09-26 DIAGNOSIS — G89.29 CHRONIC NECK PAIN: ICD-10-CM

## 2018-09-26 DIAGNOSIS — M79.7 FIBROMYALGIA: ICD-10-CM

## 2018-09-26 RX ORDER — IBUPROFEN 600 MG/1
600 TABLET ORAL
Qty: 30 TAB | Refills: 1 | Status: SHIPPED | OUTPATIENT
Start: 2018-09-26 | End: 2018-11-08 | Stop reason: SDUPTHER

## 2018-09-26 NOTE — LETTER
9/27/2018 9:46 AM 
 
Ms. Isrrael Gonzalez [address] [facility] [address] Patient will need to follow up with a Physical Medicine Rehab specialist to review better long term options to treat pains and fibromyalgia. 
  
Please see the attached referral information and call to schedule appt.

## 2018-09-26 NOTE — TELEPHONE ENCOUNTER
Medication refill request:    Last Office Visit:9/25/18  Next Office Visit:  Future Appointments  Date Time Provider Pamela Dickinson   1/9/2019 9:00 AM MD Lang Fitch [address] verified. Yes    [facility] called on behalf of the pt stating that Uma Chapman was suppose to send in a prescription for the pt's Ibuprofen 600 mg.     [facility] # [phone]

## 2018-09-27 NOTE — TELEPHONE ENCOUNTER
Medication refill authorized for a limited quantity. She will need to follow up with a PM&R specialist to review better long term options to treat her pains and fibromyalgia. I have re-ordered a referral.  Please provide her with the contact info for [facility] physician PM&R practice.

## 2018-09-27 NOTE — TELEPHONE ENCOUNTER
Writer attempted to call patient on mobilie number on file. Patient does not have working number. Referral info mailed to address on file. No further concerns.

## 2018-10-08 NOTE — TELEPHONE ENCOUNTER
Pt called back stating that the Ibuprofen 600 mg as well as the Flexeril 10 mg is not really working to help with her pain. Informed pt of 's note's about the limited quantity of the medication's and that he placed referral's for the pt for P/T as well as Neuropsychology. Pt state's that Barron Barbie didn't give her any referral's and only want's to talk with the nurse pt's # [phone].

## 2018-10-08 NOTE — TELEPHONE ENCOUNTER
Patient called on number on file. Detailed message left in regards to Dr. Aman Roblero recommendations and to return call to office if additional questions. Edwin Blanton

## 2018-10-15 ENCOUNTER — TELEPHONE (OUTPATIENT)
Dept: INTERNAL MEDICINE CLINIC | Age: 38
End: 2018-10-15

## 2018-10-15 NOTE — TELEPHONE ENCOUNTER
Spoke to patient in regards to Dr. Yohan Tejada recommendations. Patient stated she went to [address] [place] [address] before. Patient stated that PT doesn't help, can't afford it, also doesn't last long enough and she was already tested for arthritis. Will discuss with Dr. Yohan Tejada.

## 2018-10-15 NOTE — TELEPHONE ENCOUNTER
The referral to [facility] is NOT for PT, rather it is to see a physician who specializes in the management of traumatic brain injuries (TBI) and pain syndromes.     I encourage pt to make an appt for such an evaluation/consultation with the specialist.

## 2018-11-08 ENCOUNTER — OFFICE VISIT (OUTPATIENT)
Dept: INTERNAL MEDICINE CLINIC | Age: 38
End: 2018-11-08

## 2018-11-08 ENCOUNTER — HOSPITAL ENCOUNTER (OUTPATIENT)
Dept: LAB | Age: 38
Discharge: HOME | End: 2018-11-08
Payer: MEDICARE

## 2018-11-08 VITALS
DIASTOLIC BLOOD PRESSURE: 82 MMHG | BODY MASS INDEX: 25.79 KG/M2 | SYSTOLIC BLOOD PRESSURE: 111 MMHG | RESPIRATION RATE: 16 BRPM | HEIGHT: 65 IN | HEART RATE: 94 BPM | TEMPERATURE: 98.1 F | OXYGEN SATURATION: 97 % | WEIGHT: 154.8 LBS

## 2018-11-08 DIAGNOSIS — Z79.1 NSAID LONG-TERM USE: ICD-10-CM

## 2018-11-08 DIAGNOSIS — M79.7 FIBROMYALGIA: ICD-10-CM

## 2018-11-08 DIAGNOSIS — G89.29 CHRONIC NECK PAIN: ICD-10-CM

## 2018-11-08 DIAGNOSIS — M54.2 CHRONIC NECK PAIN: ICD-10-CM

## 2018-11-08 PROCEDURE — 36415 COLL VENOUS BLD VENIPUNCTURE: CPT

## 2018-11-08 PROCEDURE — 80048 BASIC METABOLIC PNL TOTAL CA: CPT

## 2018-11-08 RX ORDER — CHLORHEXIDINE GLUCONATE 1.2 MG/ML
RINSE ORAL
Refills: 0 | Status: ON HOLD | COMMUNITY
Start: 2018-10-16 | End: 2020-08-10

## 2018-11-08 RX ORDER — BISMUTH SUBSALICYLATE 262 MG
1 TABLET,CHEWABLE ORAL DAILY
COMMUNITY

## 2018-11-08 RX ORDER — IBUPROFEN 600 MG/1
600 TABLET ORAL
Qty: 60 TAB | Refills: 2 | Status: SHIPPED | OUTPATIENT
Start: 2018-11-08 | End: 2020-08-14

## 2018-11-08 RX ORDER — AMOXICILLIN 500 MG/1
CAPSULE ORAL
Refills: 0 | COMMUNITY
Start: 2018-10-16 | End: 2018-11-08

## 2018-11-08 NOTE — PROGRESS NOTES
SHAWN 
 Trenton Riley is a 43 y.o. female, she presents today for: 
 
Ms. Romina Alvarez returns today to follow-up of fibromyalgia - Review of chart shows she was last seen by me in 4/2017, under care of psychiatrist for schizophrenia at that time. - recommended follow-up with PMR for TBI and fibromyalgia. - Was seen 9/25/2018 with Dr. Susanna Shah, plan made and advised follow-up in 4 months. Today reports that she saw Dr. Susanna Shah because she came late for an appointment. States that she was not aware that she was referred to anybody. Notes that she has financial barriers to going to physical therapy. States that she is trying to find psychiatrist. Has an upcoming appointment with Dr. Carlos Phan in January (~ 2 months). - Previously followed with Dr. Elida Nash.  
 - Previously folllowed with [facility] doctors, but doesn't like how they keep changing. Today reports:  
 - having increased pain since dropping from 800 to 600 mg.  
 - also notes that she I having more pain \"that is to be expected since weather is getting colder\". - can't afford to keep going to pharmacy every week. Is asking for 90# fill.  
 - States that she has pain from fibromyalgia and from injuries. - reports that she was on suboxone. It has been a while, she is not sure when this was stopped. Maybe a year ago. Notes she had some left over so conitnued to take for a little while.  
 - also reports that she did not do well on lyrica. Because it made her feel dizzy at work. - states that the pharmacy did not give her 60 tablets for 30 days. Notes that her pain is all over, mostly in her back and neck. She gets headaches. Is not working because of pain PMH/PSH: reviewed and updated Sochx:  reports that she has quit smoking. Her smoking use included cigarettes. She does not have any smokeless tobacco history on file. She reports that she drinks alcohol.  She reports that she has current or past drug history. Drugs: Benzodiazepines, Cocaine, Marijuana, and Opiates. Famhx: reviewed and updated All: Allergies Allergen Reactions  Amoxicillin Rash Rash and itching  Codeine Itching Med:  
Current Outpatient Medications Medication Sig  chlorhexidine (PERIDEX) 0.12 % solution RINSE WITH 15 ML PO IN THE MORNING  AND IN THE EVENING  FOR 2 WEEKS. DO NOT EAT OR DRINK ANYTHING FOR 30 MINUTES AFTER RINSING  
 MELATONIN PO Take  by mouth.  multivitamin (ONE A DAY) tablet Take 1 Tab by mouth daily.  ibuprofen (MOTRIN) 600 mg tablet Take 1 Tab by mouth every eight (8) hours as needed for Pain (no more than 2 per day. ).  VALERIAN ROOT PO Take  by mouth.  loratadine (CLARITIN) 10 mg tablet Take 1 Tab by mouth daily.  guaiFENesin-dextromethorphan SR (MUCINEX DM) 600-30 mg per tablet Take 1 Tab by mouth two (2) times a day.  cyclobenzaprine (FLEXERIL) 10 mg tablet Take 0.5 Tabs by mouth three (3) times daily as needed for Muscle Spasm(s). (Patient taking differently: Take 10 mg by mouth three (3) times daily as needed for Muscle Spasm(s).)  pantoprazole (PROTONIX) 20 mg tablet Take 1 Tab by mouth daily.  cyclobenzaprine (FLEXERIL) 10 mg tablet  LOW-OGESTREL, 28, 0.3-30 mg-mcg tab  ARIPiprazole (ABILIFY) 2 mg tablet take 1 tablet by mouth every morning No current facility-administered medications for this visit. ROS 
 
PE: 
Blood pressure 111/82, pulse 94, temperature 98.1 °F (36.7 °C), temperature source Oral, resp. rate 16, height 5' 5\" (1.651 m), weight 154 lb 12.8 oz (70.2 kg), SpO2 97 %. Body mass index is 25.76 kg/m². Physical Exam  
Constitutional: She is oriented to person, place, and time. She appears well-developed and well-nourished. No distress. HENT:  
Head: Normocephalic. Mouth/Throat: Oropharynx is clear and moist.  
Eyes: Conjunctivae and EOM are normal.  
Neck: Neck supple. Cardiovascular: Normal rate, regular rhythm and normal heart sounds. Pulmonary/Chest: Effort normal and breath sounds normal.  
Neurological: She is alert and oriented to person, place, and time. Skin: Skin is warm and dry. Psychiatric:  
Slow speech, seems distracted. Inconsistent reporting. No delusions noted. Nursing note and vitals reviewed. Labs:  
See addendum A/P: 
43 y.o. female ICD-10-CM ICD-9-CM 1. Fibromyalgia M79.7 729.1 ibuprofen (MOTRIN) 600 mg tablet 2. Chronic neck pain M54.2 723.1 ibuprofen (MOTRIN) 600 mg tablet G89.29 338.29   
3. NSAID long-term use T23.6 D03.73 METABOLIC PANEL, BASIC  
   ibuprofen (MOTRIN) 600 mg tablet Fibromyalgia, complicated but unclear psychiatric disorder, and history of substance abuse: Patient is reporting taking longterm nsaids and flexeril. Unclear why she stopped following at [facility] and returned to clinic.  
 - to keep appointment with psychiatrist, encouraged considering return to [facility] as she seems to be pleased with management there if not with having to change physicians.  
 - has active flexeril rx. - check BMP, provided new rx for ibuprofen but discussed concerned for long term side effects.  
 - not willing to see PT, PMR/rheu, states she isn't interested in discussing sleep, exercise, diet. - She was given AVS and expressed understanding with the diagnosis and plan as discussed. Follow-up Disposition: 
Return in about 2 months (around 1/8/2019) for follow-up after referrals. . 
Future Appointments Date Time Provider Pamela Dickinson 12/31/2018  8:30 AM MD RADHA Polanco  
1/9/2019  9:00 AM Dante Castro MD [facility]

## 2018-11-08 NOTE — PROGRESS NOTES
RM 1 
 
Pt is not fasting today Pt states' all her medication seems to not be working ' Pt states she get referrals but can not afford to go . Pt states she needs something for anxiety but she is having trouble getting in. Pt became tearful when she started talking about anxiety and need to be seen by [facility]. If rx is given today, pt would like a written rx. Chief Complaint Patient presents with  Medication Evaluation  
  follow up 1. Have you been to the ER, urgent care clinic since your last visit? Hospitalized since your last visit? No 
 
2. Have you seen or consulted any other health care providers outside of the [address] [place] [address] since your last visit? Include any pap smears or colon screening. Oral Surgeon Health Maintenance Due Topic Date Due  
 DTaP/Tdap/Td series (1 - Tdap) 01/05/2000  PAP AKA CERVICAL CYTOLOGY  01/05/2000  MEDICARE YEARLY EXAM  03/14/2018

## 2018-11-08 NOTE — PATIENT INSTRUCTIONS
1) I still recommend that you follow-up with a physical therapist for management of fibromyalgia. Given the history of traumatic brain injury, it may be helpful to see a PMR physician as well. 2) To manage fibromyalgia: I recommend getting regular sleep, following same bedtime daily. Daily gentle exercise (walking, stretching). And following a diet rich in fruits and vegetables. 3) please consider following up with [facility] if you feel your pain and fibromyalgia needs were better met in this mulit-disciplinary setting. Fibromyalgia: Care Instructions Your Care Instructions Fibromyalgia is a painful condition that is not completely understood by medical experts. The cause of fibromyalgia is not known. It can make you feel tired and ache all over. It causes tender spots at specific points of the body that hurt only when you press on them. You may have trouble sleeping, as well as other symptoms. These problems can upset your work and home life. Symptoms tend to come and go, although they may never go away completely. Fibromyalgia does not harm your muscles, joints, or organs. Follow-up care is a key part of your treatment and safety. Be sure to make and go to all appointments, and call your doctor if you are having problems. It's also a good idea to know your test results and keep a list of the medicines you take. How can you care for yourself at home? · Exercise often. Walk, swim, or bike to help with pain and sleep problems and to make you feel better. · Try to get a good night's sleep. Go to bed and get up at the same time each day, whether you feel rested or not. Make sure you have a good mattress and pillow. · Reduce stress. Avoid things that cause you stress, if you can. If not, work at making them less stressful. Learn to use biofeedback, guided imagery, meditation, or other methods to relax. · Make healthy changes. Eat a balanced diet, quit smoking, and limit alcohol and caffeine. · Use a heating pad set on low or take warm baths or showers for pain. Using cold packs for up to 20 minutes at a time can also relieve pain. Put a thin cloth between the cold pack and your skin. A gentle massage might help too. · Be safe with medicines. Take your medicines exactly as prescribed. Call your doctor if you think you are having a problem with your medicine. Your doctor may talk to you about taking antidepressant medicines. These medicines may improve sleep, relieve pain, and in some cases treat depression. · Learn about fibromyalgia. This makes coping easier. Then, take an active role in your treatment. · Think about joining a support group with others who have fibromyalgia to learn more and get support. When should you call for help? Watch closely for changes in your health, and be sure to contact your doctor if: 
  · You feel sad, helpless, or hopeless; lose interest in things you used to enjoy; or have other symptoms of depression.  
  · Your fibromyalgia symptoms get worse. Where can you learn more? Go to http://curtis-cate.info/. Enter V003 in the search box to learn more about \"Fibromyalgia: Care Instructions. \" Current as of: June 4, 2018 Content Version: 11.8 © 4099-4727 [employer]. Care instructions adapted under license by [employer] (which disclaims liability or warranty for this information). If you have questions about a medical condition or this instruction, always ask your healthcare professional. [place] [address] any warranty or liability for your use of this information.

## 2018-11-08 NOTE — LETTER
11/9/2018 11:05 AM 
 
Ms. Marcial Miranda [address] [phone] Dear Marcial Miranda: 
 
Please find your most recent results below. Resulted Orders METABOLIC PANEL, BASIC Result Value Ref Range Glucose 79 65 - 99 mg/dL BUN 9 6 - 20 mg/dL Creatinine 0.60 0.57 - 1.00 mg/dL BUN/Creatinine ratio 15 9 - 23 Sodium 142 134 - 144 mmol/L Potassium 4.3 3.5 - 5.2 mmol/L Chloride 104 96 - 106 mmol/L  
 CO2 26 20 - 29 mmol/L Calcium 9.1 8.7 - 10.2 mg/dL Narrative Performed at:  [address]  [id number] : Chip Joseph MD, Phone:  [phone] RECOMMENDATIONS: 
Kidney function stable. Normal electrolytes. Okay to continue with cautious nsaid therapy at this time. Please call me if you have any questions: [phone] Sincerely, 
 
 
Mary Moon MD

## 2018-11-09 LAB
BUN SERPL-MCNC: 9 MG/DL (ref 6–20)
BUN/CREAT SERPL: 15 (ref 9–23)
CALCIUM SERPL-MCNC: 9.1 MG/DL (ref 8.7–10.2)
CHLORIDE SERPL-SCNC: 104 MMOL/L (ref 96–106)
CO2 SERPL-SCNC: 26 MMOL/L (ref 20–29)
CREAT SERPL-MCNC: 0.6 MG/DL (ref 0.57–1)
GLUCOSE SERPL-MCNC: 79 MG/DL (ref 65–99)
POTASSIUM SERPL-SCNC: 4.3 MMOL/L (ref 3.5–5.2)
SODIUM SERPL-SCNC: 142 MMOL/L (ref 134–144)

## 2018-11-09 NOTE — PROGRESS NOTES
Kidney function stable. Normal electrolytes. Okay to continue with cautious nsaid therapy at this time.

## 2018-12-16 RX ORDER — IBUPROFEN 600 MG/1
TABLET ORAL
Qty: 30 TAB | Refills: 0 | OUTPATIENT
Start: 2018-12-16

## 2018-12-17 NOTE — TELEPHONE ENCOUNTER
Ibuprofen 600mg should be active and on file per 11/8/2018 script. Agree with declining ibuprofen 800mg refill. Will discuss further at follow-up in 2 weeks.    Judith Mcarthur MD

## 2019-09-30 DIAGNOSIS — M79.7 FIBROMYALGIA: ICD-10-CM

## 2019-09-30 RX ORDER — CYCLOBENZAPRINE HCL 10 MG
TABLET ORAL
Qty: 60 TAB | Refills: 0 | Status: ON HOLD | OUTPATIENT
Start: 2019-09-30 | End: 2020-08-10

## 2020-08-08 ENCOUNTER — HOSPITAL ENCOUNTER (INPATIENT)
Age: 40
LOS: 6 days | Discharge: HOME | DRG: 885 | End: 2020-08-14
Attending: PSYCHIATRY & NEUROLOGY | Admitting: PSYCHIATRY & NEUROLOGY
Payer: MEDICARE

## 2020-08-08 PROCEDURE — 65220000003 HC RM SEMIPRIVATE PSYCH

## 2020-08-08 PROCEDURE — 74011250637 HC RX REV CODE- 250/637: Performed by: NURSE PRACTITIONER

## 2020-08-08 RX ORDER — ACETAMINOPHEN 325 MG/1
650 TABLET ORAL
Status: DISCONTINUED | OUTPATIENT
Start: 2020-08-08 | End: 2020-08-14 | Stop reason: HOSPADM

## 2020-08-08 RX ORDER — PHENOBARBITAL 32.4 MG/1
16.2 TABLET ORAL 2 TIMES DAILY
Status: COMPLETED | OUTPATIENT
Start: 2020-08-10 | End: 2020-08-11

## 2020-08-08 RX ORDER — DIPHENHYDRAMINE HYDROCHLORIDE 50 MG/ML
50 INJECTION, SOLUTION INTRAMUSCULAR; INTRAVENOUS
Status: DISCONTINUED | OUTPATIENT
Start: 2020-08-08 | End: 2020-08-14 | Stop reason: HOSPADM

## 2020-08-08 RX ORDER — PHENOBARBITAL 32.4 MG/1
32.4 TABLET ORAL
Status: DISPENSED | OUTPATIENT
Start: 2020-08-08 | End: 2020-08-10

## 2020-08-08 RX ORDER — HALOPERIDOL 5 MG/1
5 TABLET ORAL 2 TIMES DAILY
Status: DISCONTINUED | OUTPATIENT
Start: 2020-08-08 | End: 2020-08-09

## 2020-08-08 RX ORDER — BENZTROPINE MESYLATE 1 MG/1
1 TABLET ORAL
Status: DISCONTINUED | OUTPATIENT
Start: 2020-08-08 | End: 2020-08-14 | Stop reason: HOSPADM

## 2020-08-08 RX ORDER — ADHESIVE BANDAGE
30 BANDAGE TOPICAL DAILY PRN
Status: DISCONTINUED | OUTPATIENT
Start: 2020-08-08 | End: 2020-08-14 | Stop reason: HOSPADM

## 2020-08-08 RX ORDER — IBUPROFEN 400 MG/1
400 TABLET ORAL
Status: DISCONTINUED | OUTPATIENT
Start: 2020-08-08 | End: 2020-08-10

## 2020-08-08 RX ORDER — PHENOBARBITAL 32.4 MG/1
32.4 TABLET ORAL 2 TIMES DAILY
Status: COMPLETED | OUTPATIENT
Start: 2020-08-09 | End: 2020-08-10

## 2020-08-08 RX ORDER — PHENOBARBITAL 32.4 MG/1
16.2 TABLET ORAL
Status: ACTIVE | OUTPATIENT
Start: 2020-08-10 | End: 2020-08-11

## 2020-08-08 RX ORDER — LORAZEPAM 2 MG/ML
1 INJECTION INTRAMUSCULAR
Status: DISCONTINUED | OUTPATIENT
Start: 2020-08-08 | End: 2020-08-14 | Stop reason: HOSPADM

## 2020-08-08 RX ORDER — HYDROXYZINE 25 MG/1
50 TABLET, FILM COATED ORAL
Status: DISCONTINUED | OUTPATIENT
Start: 2020-08-08 | End: 2020-08-14 | Stop reason: HOSPADM

## 2020-08-08 RX ORDER — IBUPROFEN 200 MG
1 TABLET ORAL DAILY
Status: DISCONTINUED | OUTPATIENT
Start: 2020-08-09 | End: 2020-08-13

## 2020-08-08 RX ORDER — TRAZODONE HYDROCHLORIDE 50 MG/1
50 TABLET ORAL
Status: DISCONTINUED | OUTPATIENT
Start: 2020-08-08 | End: 2020-08-14 | Stop reason: HOSPADM

## 2020-08-08 RX ORDER — HALOPERIDOL 5 MG/ML
5 INJECTION INTRAMUSCULAR
Status: DISCONTINUED | OUTPATIENT
Start: 2020-08-08 | End: 2020-08-14 | Stop reason: HOSPADM

## 2020-08-08 RX ORDER — PHENOBARBITAL 32.4 MG/1
32.4 TABLET ORAL 4 TIMES DAILY
Status: COMPLETED | OUTPATIENT
Start: 2020-08-08 | End: 2020-08-09

## 2020-08-08 RX ORDER — OLANZAPINE 5 MG/1
5 TABLET ORAL
Status: DISCONTINUED | OUTPATIENT
Start: 2020-08-08 | End: 2020-08-14 | Stop reason: HOSPADM

## 2020-08-08 RX ADMIN — IBUPROFEN 400 MG: 400 TABLET ORAL at 21:50

## 2020-08-08 RX ADMIN — ACETAMINOPHEN 650 MG: 325 TABLET, FILM COATED ORAL at 23:41

## 2020-08-08 RX ADMIN — PHENOBARBITAL 32.4 MG: 32.4 TABLET ORAL at 21:48

## 2020-08-08 RX ADMIN — HYDROXYZINE HYDROCHLORIDE 50 MG: 25 TABLET, FILM COATED ORAL at 23:40

## 2020-08-08 RX ADMIN — PHENOBARBITAL 32.4 MG: 32.4 TABLET ORAL at 17:16

## 2020-08-08 RX ADMIN — HALOPERIDOL 5 MG: 5 TABLET ORAL at 17:17

## 2020-08-08 NOTE — PROGRESS NOTES
Patient arrived via wheelchair from [facility]. Patient accompanied by security and with TDO paperwork. Patient alert, cooperative, disorganized. Patient oriented to room and unit, unit orientation packet reviewed with patient and patient requested to fill out checklist and de-escalation form later. Information for admission obtained from [facility] paperwork and patient although patient poor historian and disorganized with thoughts. Patient also suspicious/paranoid of intake questions. Dual skin assessment completed by myself and Michelle Bello RN. Patients belongings locked on unit, no valuables with patient aside from eyeglasses (remaining with patient) and belly button ring (remaining in place). Patient denies drug use, UDS + benzos. Patient states she takes 5 mg of ativan 4 times a day but also denies history of drug use. Patient often inconsistent, disorganized, paranoid during admission questions and assessment. Carolina Santana NP aware. Telephone orders obtained from ROSETTE Hogue NP.

## 2020-08-08 NOTE — PROGRESS NOTES
Critical lab result called by ron in the lab at 2306, hemoglobin of 6.1. Doctor was not notified because he is going to be transfused 2 units.  H&H will be drawn after transfusion complete, will notify if critical. Problem: Altered Thought Process (Adult/Pediatric)  Goal: *STG: Participates in treatment plan  8/8/2020 1842 by Shey Kaye  Outcome: Progressing Towards Goal  8/8/2020 1841 by Shey Kaye  Outcome: Progressing Towards Goal  Goal: *STG: Remains safe in hospital  8/8/2020 1842 by Shey Kaye  Outcome: Progressing Towards Goal  8/8/2020 1841 by Shey Kaye  Outcome: Progressing Towards Goal  Goal: *STG: Complies with medication therapy  8/8/2020 1842 by Shey Kaye  Outcome: Progressing Towards Goal  8/8/2020 1841 by Shey Kaye  Outcome: Progressing Towards Goal  Goal: *STG: Decreased delusional thinking  8/8/2020 1842 by Shey Kaye  Outcome: Progressing Towards Goal  8/8/2020 1841 by Shey Kaye  Outcome: Progressing Towards Goal     Problem: Falls - Risk of  Goal: *Absence of Falls  Description: Document Consuelo Jarrett Fall Risk and appropriate interventions in the flowsheet.   Outcome: Progressing Towards Goal  Note: Fall Risk Interventions:

## 2020-08-09 PROBLEM — F25.9 SCHIZOAFFECTIVE DISORDER (HCC): Status: ACTIVE | Noted: 2020-08-09

## 2020-08-09 PROCEDURE — 74011250637 HC RX REV CODE- 250/637: Performed by: NURSE PRACTITIONER

## 2020-08-09 PROCEDURE — 65220000003 HC RM SEMIPRIVATE PSYCH

## 2020-08-09 RX ORDER — ZIPRASIDONE HYDROCHLORIDE 20 MG/1
20 CAPSULE ORAL 2 TIMES DAILY WITH MEALS
Status: DISCONTINUED | OUTPATIENT
Start: 2020-08-09 | End: 2020-08-11

## 2020-08-09 RX ADMIN — HYDROXYZINE HYDROCHLORIDE 50 MG: 25 TABLET, FILM COATED ORAL at 11:04

## 2020-08-09 RX ADMIN — IBUPROFEN 400 MG: 400 TABLET ORAL at 18:05

## 2020-08-09 RX ADMIN — HALOPERIDOL 5 MG: 5 TABLET ORAL at 09:06

## 2020-08-09 RX ADMIN — IBUPROFEN 400 MG: 400 TABLET ORAL at 09:56

## 2020-08-09 RX ADMIN — OLANZAPINE 5 MG: 5 TABLET, FILM COATED ORAL at 09:56

## 2020-08-09 RX ADMIN — TRAZODONE HYDROCHLORIDE 50 MG: 50 TABLET ORAL at 20:53

## 2020-08-09 RX ADMIN — HYDROXYZINE HYDROCHLORIDE 50 MG: 25 TABLET, FILM COATED ORAL at 20:54

## 2020-08-09 RX ADMIN — ACETAMINOPHEN 650 MG: 325 TABLET, FILM COATED ORAL at 20:53

## 2020-08-09 RX ADMIN — ZIPRASIDONE HYDROCHLORIDE 20 MG: 20 CAPSULE ORAL at 18:04

## 2020-08-09 RX ADMIN — PHENOBARBITAL 32.4 MG: 32.4 TABLET ORAL at 18:04

## 2020-08-09 RX ADMIN — PHENOBARBITAL 32.4 MG: 32.4 TABLET ORAL at 11:05

## 2020-08-09 RX ADMIN — PHENOBARBITAL 32.4 MG: 32.4 TABLET ORAL at 09:06

## 2020-08-09 NOTE — H&P
INITIAL PSYCHIATRIC INTERVIEW:      CHIEF COMPLAINT:\"I FEEL BETTER\"        HISTORY OF PRESENTING COMPLAINT:  Sophia Grande is a 38 y.o. OTHER  WHITE OR  female who is currently admitted to the psychiatric floor at [address] [place] [address]. She was transferred from [facility] as a TDO due to disorganized and bizarre behavior. She appears somewhat confused, preoccupied upon assessment. She is a poor historian. She does reports previous admissions into the hospital though is unable to provide reason and/or dates. She endorses paranoia and is suspicious of treatment. She is requesting a benzo to help her \"calm down\". Jaida Albrecht reports a h/o medication and treatment noncompliance. She admits to \"probably acting bizarre\" and poor hygiene. She reports that she wants to be restarted on her medications        PAST PSYCHIATRIC HISTORY and SUBSTANCE ABUSE HISTORY:  UDS positive for benzodiazepines  Previous admissions, hx noncompliance      PAST MEDICAL HISTORY:  Please see H&P for details. Past Medical History:   Diagnosis Date    Brain injury [facility])     ?  Fibromyalgia     Marijuana dependence (HCC)     Polysubstance dependence (HCC)     narcotic, stimulants, MJ, benzos    Psychiatric disorder     schizophrenia vs. schizoaffective dis    Seizures ([facility] [address]     ?  Tobacco dependence        Prior to Admission medications    Medication Sig Start Date End Date Taking? Authorizing Provider   cyclobenzaprine (FLEXERIL) 10 mg tablet TAKE 1/2 TABLET BY MOUTH THREE TIMES DAILY AS NEEDED FOR MUSCLE SPASMS 9/30/19   Brian GIFFORD, NP   chlorhexidine (PERIDEX) 0.12 % solution RINSE WITH 15 ML PO IN THE MORNING  AND IN THE EVENING  FOR 2 WEEKS. DO NOT EAT OR DRINK ANYTHING FOR 30 MINUTES AFTER RINSING 10/16/18   Provider, Historical   MELATONIN PO Take  by mouth. Provider, Historical   multivitamin (ONE A DAY) tablet Take 1 Tab by mouth daily.     Provider, Historical   ibuprofen (MOTRIN) 600 mg tablet Take 1 Tab by mouth every eight (8) hours as needed for Pain (no more than 2 per day. ). 11/8/18   Iraj Rzd MD   LOW-OGESTREL, 28, 0.3-30 mg-mcg tab  9/7/18   Provider, Historical   VALERIAN ROOT PO Take  by mouth. Provider, Historical   loratadine (CLARITIN) 10 mg tablet Take 1 Tab by mouth daily. 9/25/18   Will Moore MD   guaiFENesin-dextromethorphan SR [facility] DM) 600-30 mg per tablet Take 1 Tab by mouth two (2) times a day. 9/25/18   Will Moore MD   pantoprazole (PROTONIX) 20 mg tablet Take 1 Tab by mouth daily. 4/5/17   Iraj Rdz MD   ARIPiprazole (ABILIFY) 2 mg tablet take 1 tablet by mouth every morning 2/17/17   Provider, Historical         Lab Results   Component Value Date/Time    WBC 6.5 06/08/2016 11:20 AM    HGB 12.4 06/08/2016 11:20 AM    HCT 37.8 06/08/2016 11:20 AM    PLATELET 065 78/31/2258 11:20 AM    MCV 96 06/08/2016 11:20 AM      Lab Results   Component Value Date/Time    Sodium 142 11/08/2018 12:52 PM    Potassium 4.3 11/08/2018 12:52 PM    Chloride 104 11/08/2018 12:52 PM    CO2 26 11/08/2018 12:52 PM    Anion gap 8 05/18/2016 02:36 PM    Glucose 79 11/08/2018 12:52 PM    Glucose 89 08/27/2015 05:35 AM    BUN 9 11/08/2018 12:52 PM    Creatinine 0.60 11/08/2018 12:52 PM    BUN/Creatinine ratio 15 11/08/2018 12:52 PM    GFR est AA >60 05/18/2016 02:36 PM    GFR est non-AA >60 05/18/2016 02:36 PM    Calcium 9.1 11/08/2018 12:52 PM    Bilirubin, total <0.2 06/08/2016 11:20 AM    Alk.  phosphatase 59 06/08/2016 11:20 AM    Protein, total 6.3 06/08/2016 11:20 AM    Albumin 4.2 06/08/2016 11:20 AM    Globulin 3.0 05/23/2016 05:25 AM    A-G Ratio 2.0 06/08/2016 11:20 AM    ALT (SGPT) 15 06/08/2016 11:20 AM      Vitals:    08/08/20 1515 08/08/20 1935 08/09/20 0747   BP: 121/84 108/75 120/80   Pulse: 83 81 85   Resp: 18 16 16   Temp: 98.4 °F (36.9 °C) 97.9 °F (36.6 °C) 98.1 °F (36.7 °C)   SpO2: 100% 100% 99%   Weight: 71.2 kg (157 lb)     Height: 5' 5\" (1.651 m)           PSYCHOSOCIAL HISTORY:    Lives w/ dad and roommate    MENTAL STATUS EXAM:  General appearance:   Poorly- groomed, psychomotor activity is relaxed  Eye contact: Poor eye contact  Speech: Spontaneous, soft, decreased output. Affect : Blunted  Mood: Calm  Thought Process: Simplistic  Perception: Appears preoccupied, reports hx AVH, denies currentl  Thought Content: SI or Plan  Insight: Partial  Judgement: Fair  Cognition: Intact grossly. ASSESSMENT AND PLAN:  Shelia eNal meets criteria for a diagnosis of  Schizoaffective disorder . Continue inpatient stay for the safety and optimum care of the patient   Routine labs to be ordered as needed. Psychotropic medications will be ordered and adjusted as needed. Patients status is TDO   Supportive, milieu and group therapy. Continue rest of the medications as needed. Strengths include ability to seek help and   Estimated length of stay is 5-7 days.
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
## 2020-08-09 NOTE — BH NOTES
Patient is sitting in the dayroom talking with peers ,denies SI/HI/AVH and is calm and cooperative  2150 PRN motrin po 400 mg for pain  2341 PRN po tylenol 650 mg for pain in upper left leg  Slept 6 hours

## 2020-08-09 NOTE — BH NOTES
Report received from off going nurse, assumed care of resident. Ms Meagan Mcguire is alert and oriented. She is visible on the milieu. She was seen in the dayroom interacting well with both peers and staff. She is meal compliant. Denies all SI/HI, AVH, anxiety, and depression. There are no noted issues with pain or discomfort. Will continue to monitor for changes in condition.

## 2020-08-09 NOTE — PROGRESS NOTES
Problem: Falls - Risk of  Goal: *Absence of Falls  Description: Document [facility] Fall Risk and appropriate interventions in the flowsheet.   Outcome: Progressing Towards Goal  Note: Fall Risk Interventions:

## 2020-08-10 PROCEDURE — 74011250637 HC RX REV CODE- 250/637: Performed by: PSYCHIATRY & NEUROLOGY

## 2020-08-10 PROCEDURE — 65220000003 HC RM SEMIPRIVATE PSYCH

## 2020-08-10 PROCEDURE — 74011250637 HC RX REV CODE- 250/637: Performed by: NURSE PRACTITIONER

## 2020-08-10 RX ORDER — HYDROXYZINE PAMOATE 50 MG/1
50 CAPSULE ORAL
COMMUNITY

## 2020-08-10 RX ORDER — IBUPROFEN 400 MG/1
800 TABLET ORAL
Status: DISCONTINUED | OUTPATIENT
Start: 2020-08-10 | End: 2020-08-14 | Stop reason: HOSPADM

## 2020-08-10 RX ADMIN — IBUPROFEN 800 MG: 400 TABLET ORAL at 16:37

## 2020-08-10 RX ADMIN — PHENOBARBITAL 32.4 MG: 32.4 TABLET ORAL at 01:03

## 2020-08-10 RX ADMIN — TRAZODONE HYDROCHLORIDE 50 MG: 50 TABLET ORAL at 21:04

## 2020-08-10 RX ADMIN — IBUPROFEN 400 MG: 400 TABLET ORAL at 10:09

## 2020-08-10 RX ADMIN — PHENOBARBITAL 16.2 MG: 32.4 TABLET ORAL at 16:37

## 2020-08-10 RX ADMIN — ZIPRASIDONE HYDROCHLORIDE 20 MG: 20 CAPSULE ORAL at 08:35

## 2020-08-10 RX ADMIN — IBUPROFEN 400 MG: 400 TABLET ORAL at 02:33

## 2020-08-10 RX ADMIN — ZIPRASIDONE HYDROCHLORIDE 20 MG: 20 CAPSULE ORAL at 16:37

## 2020-08-10 RX ADMIN — ACETAMINOPHEN 650 MG: 325 TABLET, FILM COATED ORAL at 21:02

## 2020-08-10 RX ADMIN — PHENOBARBITAL 32.4 MG: 32.4 TABLET ORAL at 08:30

## 2020-08-10 RX ADMIN — ACETAMINOPHEN 650 MG: 325 TABLET, FILM COATED ORAL at 08:34

## 2020-08-10 RX ADMIN — ACETAMINOPHEN 650 MG: 325 TABLET, FILM COATED ORAL at 13:33

## 2020-08-10 RX ADMIN — HYDROXYZINE HYDROCHLORIDE 50 MG: 25 TABLET, FILM COATED ORAL at 06:00

## 2020-08-10 RX ADMIN — HYDROXYZINE HYDROCHLORIDE 50 MG: 25 TABLET, FILM COATED ORAL at 21:02

## 2020-08-10 NOTE — BH NOTES
Received pt ambulating in hallway at [address] [place] [address] on 8/10/20. Pt was moderatively talkative and talked with her nurse for quite some time. In fairly good spirits. Complied w/ PM meds. Ate PM nack. Socialized w/ peers some in day room. Pt somewhat guarded but was willing to complete assessment. Denies SI/HI/AH/VH. Pt CIWA scores have been a 9 - 9 - and 4 throughout the night. She requested PRN meds frequently. Given @ 2053 pt received Trazadone, Tylenol and Atarax, along with another dose of Atarax @ 0600. Pt also received Motrin @ 0230 and Phenobarbital 32.4mg @ 0103 for CIWA score of 9 for c/o withdrawal Sx. See CIWA list. Most recent CIWA = 4 at 0515. Pt lied back down stating she's going to try to get back to sleep. Pt slept 6 hrs on and off thus far.

## 2020-08-10 NOTE — PROGRESS NOTES
Laboratory monitoring for mood stabilizer and antipsychotics:    Recommended baseline monitoring has not been completed based on this patient's current medication regimen. The following labs have been ordered to complete baseline monitoring: hemoglobin A1c, lipid panel    The following labs were completed at transferring facility: CBC, CMP, BMP, UDS, BAL, UA, TSH, HCG. Please see transfer paperwork in paper chart/scanned in media tab for details. The patient is currently taking the following medication(s):   Current Facility-Administered Medications   Medication Dose Route Frequency    ziprasidone (GEODON) capsule 20 mg  20 mg Oral BID WITH MEALS    nicotine (NICODERM CQ) 21 mg/24 hr patch 1 Patch  1 Patch TransDERmal DAILY    PHENobarbitaL (LUMINAL) tablet 16.2 mg  16.2 mg Oral BID       Height, Weight, BMI Estimation  Estimated body mass index is 26.13 kg/m² as calculated from the following:    Height as of this encounter: 165.1 cm (65\"). Weight as of this encounter: 71.2 kg (157 lb). Renal Function, Hepatic Function and Chemistry  CrCl cannot be calculated (Patient's most recent lab result is older than the maximum 180 days allowed. ). Lab Results   Component Value Date/Time    Sodium 142 11/08/2018 12:52 PM    Potassium 4.3 11/08/2018 12:52 PM    Chloride 104 11/08/2018 12:52 PM    CO2 26 11/08/2018 12:52 PM    Anion gap 8 05/18/2016 02:36 PM    Glucose 79 11/08/2018 12:52 PM    Glucose 89 08/27/2015 05:35 AM    BUN 9 11/08/2018 12:52 PM    Creatinine 0.60 11/08/2018 12:52 PM    BUN/Creatinine ratio 15 11/08/2018 12:52 PM    GFR est AA >60 05/18/2016 02:36 PM    GFR est non-AA >60 05/18/2016 02:36 PM    Calcium 9.1 11/08/2018 12:52 PM    ALT (SGPT) 15 06/08/2016 11:20 AM    Alk.  phosphatase 59 06/08/2016 11:20 AM    Protein, total 6.3 06/08/2016 11:20 AM    Albumin 4.2 06/08/2016 11:20 AM    Globulin 3.0 05/23/2016 05:25 AM    A-G Ratio 2.0 06/08/2016 11:20 AM    Bilirubin, total <0.2 06/08/2016 11:20 AM       Lab Results   Component Value Date/Time    Glucose 79 11/08/2018 12:52 PM    Glucose 89 08/27/2015 05:35 AM       No results found for: HBA1C, HGBE8, FAU9CHDZ    Hematology  Lab Results   Component Value Date/Time    WBC 6.5 06/08/2016 11:20 AM    HGB 12.4 06/08/2016 11:20 AM    HCT 37.8 06/08/2016 11:20 AM    PLATELET 112 67/92/0742 11:20 AM    MCV 96 06/08/2016 11:20 AM       Lipids  Lab Results   Component Value Date/Time    Cholesterol, total 119 08/27/2015 05:35 AM    HDL Cholesterol 71 08/27/2015 05:35 AM    LDL, calculated 36.2 08/27/2015 05:35 AM    Triglyceride 59 08/27/2015 05:35 AM    CHOL/HDL Ratio 1.7 08/27/2015 05:35 AM       Thyroid Function    Lab Results   Component Value Date/Time    TSH 1.94 08/25/2015 01:07 PM     Vitals  Visit Vitals  /72 (BP 1 Location: Right arm, BP Patient Position: Sitting)   Pulse 81   Temp 98.2 °F (36.8 °C)   Resp 16   Ht 165.1 cm (65\")   Wt 71.2 kg (157 lb)   SpO2 99%   BMI 26.13 kg/m²       Pregnancy Test  Lab Results   Component Value Date/Time    HCG urine, QL NEGATIVE  05/18/2016 04:11 PM    HCG, Ql. NEGATIVE  08/25/2015 01:07 PM       Agustin Corrales  PharmD Candidate [phone] (pharmacy)

## 2020-08-10 NOTE — INTERDISCIPLINARY ROUNDS
Behavioral Health Interdisciplinary Rounds Patient Name: Shelia Neal Age: 38 y.o. Room/Bed:  310/ Primary Diagnosis: <principal problem not specified> Admission Status: TDO Readmission within 30 days: No 
Power of  in place: No 
Patient requires a blocked bed: No           
Reason for blocked bed: N/A Order for blocked bed obtained: No    
 
Sleep hours: 6 Morning Labs completed per orders: N/A Participation in Care/Groups: Yes Medication Compliant?: Yes PRNS (last 24 hours): Motrin @ [id number]; [id number]; 1285. Atarax @ 2054; 0600. Tylenol @ 2053; Zyprexa @ [id number]. Phenobarbital @ 0103. Trazadone @ 2053 Restraints (last 24 hours): No 
Substance Abuse: No   
24 hour chart check complete:  Yes

## 2020-08-10 NOTE — BH NOTES
Behavioral Health Treatment Team Note     Patient goal(s) for today: take medication as prescribed, attend groups  Treatment team focus/goals: adjust medications as needed, collateral    Progress note: Dalton Fan reports no SI, HI, or hallucinations. She reports needing to get a new psychiatrist because she has been unable to see hers due to [place]. She reports not having a current therapist or psychiatrist. Adolm Shade are fair but patient appeared restless. She reports appetite and sleep are normal. She volunteered at her TDO hearing today wanting help with her medications. LOS:  2  Expected LOS: TBD    Insurance info/prescription coverage:  [employer] Medicare Part A&B and TDO  Date of last family contact:  No KAYCEE at this time  Family requesting physician contact today:  no  Discharge plan:  Return home  Guns in the home:  no   Outpatient provider(s):   To be linked    Participating treatment team members: Dr Donovan Jennings MD

## 2020-08-10 NOTE — PROGRESS NOTES
Problem: Altered Thought Process (Adult/Pediatric)  Goal: *STG: Participates in treatment plan  Outcome: Progressing Towards Goal  Goal: *STG: Remains safe in hospital  Outcome: Progressing Towards Goal  Goal: *STG: Complies with medication therapy  Outcome: Progressing Towards Goal  Goal: *STG: Decreased delusional thinking  Outcome: Progressing Towards Goal     Problem: Falls - Risk of  Goal: *Absence of Falls  Description: Document [facility] Fall Risk and appropriate interventions in the flowsheet.   Outcome: Progressing Towards Goal  Note: Fall Risk Interventions:

## 2020-08-10 NOTE — PROGRESS NOTES
Problem: Altered Thought Process (Adult/Pediatric)  Goal: *STG: Participates in treatment plan  Outcome: Progressing Towards Goal     Problem: Altered Thought Process (Adult/Pediatric)  Goal: *STG: Remains safe in hospital  Outcome: Progressing Towards Goal     Problem: Altered Thought Process (Adult/Pediatric)  Goal: *STG: Complies with medication therapy  Outcome: Progressing Towards Goal

## 2020-08-10 NOTE — BH NOTES
0800 pt is calm and cooperative. Requesting pain medications. Pt ate breakfast. Was given pain medication and scheduled medication. Pt denies si/hi/a/v vo. socializing with peers. 1000 pt is visible on the unit. Calm and cooperative. socializing with peers. 1200 pt is visible on the unit. Ate lunch. Visible on th unit. Requesting pain medication and was given medication. 1400 pt is visible on the unit socializing with peers. 1600 pt is visible on the unit socializing with peers. 1800 pt ate dinner. Calm and cooperative. Socializing with peers.

## 2020-08-10 NOTE — BH NOTES
[employer] conducted a virtual TDO Hearing this morning. The ending result of hearing, patient voluntary.

## 2020-08-11 LAB
CHOLEST SERPL-MCNC: 153 MG/DL
EST. AVERAGE GLUCOSE BLD GHB EST-MCNC: 97 MG/DL
HBA1C MFR BLD: 5 % (ref 4–5.6)
HDLC SERPL-MCNC: 56 MG/DL
HDLC SERPL: 2.7 {RATIO} (ref 0–5)
LDLC SERPL CALC-MCNC: 81.8 MG/DL (ref 0–100)
LIPID PROFILE,FLP: NORMAL
TRIGL SERPL-MCNC: 76 MG/DL (ref ?–150)
VLDLC SERPL CALC-MCNC: 15.2 MG/DL

## 2020-08-11 PROCEDURE — 74011250637 HC RX REV CODE- 250/637: Performed by: PSYCHIATRY & NEUROLOGY

## 2020-08-11 PROCEDURE — 36415 COLL VENOUS BLD VENIPUNCTURE: CPT

## 2020-08-11 PROCEDURE — 80061 LIPID PANEL: CPT

## 2020-08-11 PROCEDURE — 65220000003 HC RM SEMIPRIVATE PSYCH

## 2020-08-11 PROCEDURE — 83036 HEMOGLOBIN GLYCOSYLATED A1C: CPT

## 2020-08-11 PROCEDURE — 74011250637 HC RX REV CODE- 250/637: Performed by: NURSE PRACTITIONER

## 2020-08-11 RX ORDER — HALOPERIDOL 5 MG/1
5 TABLET ORAL 2 TIMES DAILY
Status: DISCONTINUED | OUTPATIENT
Start: 2020-08-11 | End: 2020-08-14 | Stop reason: HOSPADM

## 2020-08-11 RX ORDER — BENZTROPINE MESYLATE 1 MG/1
1 TABLET ORAL 2 TIMES DAILY
Status: DISCONTINUED | OUTPATIENT
Start: 2020-08-11 | End: 2020-08-14 | Stop reason: HOSPADM

## 2020-08-11 RX ADMIN — PHENOBARBITAL 16.2 MG: 32.4 TABLET ORAL at 08:54

## 2020-08-11 RX ADMIN — HYDROXYZINE HYDROCHLORIDE 50 MG: 25 TABLET, FILM COATED ORAL at 21:13

## 2020-08-11 RX ADMIN — IBUPROFEN 800 MG: 400 TABLET ORAL at 04:31

## 2020-08-11 RX ADMIN — ACETAMINOPHEN 650 MG: 325 TABLET, FILM COATED ORAL at 08:54

## 2020-08-11 RX ADMIN — BENZTROPINE MESYLATE 1 MG: 1 TABLET ORAL at 16:53

## 2020-08-11 RX ADMIN — TRAZODONE HYDROCHLORIDE 50 MG: 50 TABLET ORAL at 21:13

## 2020-08-11 RX ADMIN — ACETAMINOPHEN 650 MG: 325 TABLET, FILM COATED ORAL at 21:13

## 2020-08-11 RX ADMIN — IBUPROFEN 800 MG: 400 TABLET ORAL at 16:53

## 2020-08-11 RX ADMIN — HALOPERIDOL 5 MG: 5 TABLET ORAL at 16:53

## 2020-08-11 NOTE — BH NOTES
Psychiatric Progress Note    Patient: Meredith Liu MRN: [id number]  SSN: [id number]    YOB: 1980  Age: 38 y.o. Sex: female      Admit Date: 8/8/2020    LOS: 3 days     Subjective:     Meredith Liu  reports feeling restless and moods are fair. Notes pacing and has a migraine. Denies SI/HI/AH/VH. No aggression or violence. Appropriately interactive and aware. Tolerating medications well. Eating fairly and sleeping alright.    8/11 - Meredith Liu reports seeing color blots with Geodon and better tolerated Risperdal and haldol. Moods are fair. Denies SI/HI/AH/VH. No aggression or violence. Appropriately interactive and aware. Tolerating medications well. Eating and sleeping fairly. Notes trouble focusing her thoughts. Notes dryness with Zyprexa, took Adderall and Vyvanse historically.     Objective:     Vitals:    08/10/20 0800 08/10/20 1647 08/10/20 2009 08/11/20 0747   BP: 106/72 110/79 121/82 114/76   Pulse: 81 78 85 76   Resp: 16 16 18 18   Temp: 98.2 °F (36.8 °C) 98 °F (36.7 °C) 98.1 °F (36.7 °C) 97.9 °F (36.6 °C)   SpO2: 99% 99% 99% 100%   Weight:       Height:            Mental Status Exam:   Sensorium  oriented to time, place and person   Relations cooperative   Eye Contact appropriate   Appearance:  age appropriate, sauntering   Speech:  normal volume and non-pressured   Thought Process: goal directed   Thought Content free of delusions and free of hallucinations   Suicidal ideations none   Mood:  anxious   Affect:  constricted   Memory   adequate   Concentration:  adequate   Insight:  fair   Judgment:  impaired due to condition       MEDICATIONS:  Current Facility-Administered Medications   Medication Dose Route Frequency    haloperidoL (HALDOL) tablet 5 mg  5 mg Oral BID    benztropine (COGENTIN) tablet 1 mg  1 mg Oral BID    ibuprofen (MOTRIN) tablet 800 mg  800 mg Oral TID PRN    OLANZapine (ZyPREXA) tablet 5 mg  5 mg Oral Q6H PRN    haloperidol lactate (HALDOL) injection 5 mg  5 mg IntraMUSCular Q6H PRN    benztropine (COGENTIN) tablet 1 mg  1 mg Oral BID PRN    diphenhydrAMINE (BENADRYL) injection 50 mg  50 mg IntraMUSCular BID PRN    hydrOXYzine HCL (ATARAX) tablet 50 mg  50 mg Oral TID PRN    LORazepam (ATIVAN) injection 1 mg  1 mg IntraMUSCular Q4H PRN    traZODone (DESYREL) tablet 50 mg  50 mg Oral QHS PRN    acetaminophen (TYLENOL) tablet 650 mg  650 mg Oral Q4H PRN    magnesium hydroxide (MILK OF MAGNESIA) 400 mg/5 mL oral suspension 30 mL  30 mL Oral DAILY PRN    nicotine (NICODERM CQ) 21 mg/24 hr patch 1 Patch  1 Patch TransDERmal DAILY    PHENobarbitaL (LUMINAL) tablet 16.2 mg  16.2 mg Oral Q6H PRN      DISCUSSION:   the risks and benefits of the proposed medication  patient given opportunity to ask questions    Lab/Data Review: All lab results for the last 24 hours reviewed.      Recent Results (from the past 24 hour(s))   LIPID PANEL    Collection Time: 08/11/20  5:58 AM   Result Value Ref Range    LIPID PROFILE          Cholesterol, total 153 <200 MG/DL    Triglyceride 76 <150 MG/DL    HDL Cholesterol 56 MG/DL    LDL, calculated 81.8 0 - 100 MG/DL    VLDL, calculated 15.2 MG/DL    CHOL/HDL Ratio 2.7 0.0 - 5.0     HEMOGLOBIN A1C WITH EAG    Collection Time: 08/11/20  5:58 AM   Result Value Ref Range    Hemoglobin A1c 5.0 4.0 - 5.6 %    Est. average glucose 97 mg/dL         Assessment:     Principal Problem:    Schizoaffective disorder ([facility] [address] (8/9/2020)        Plan:     Continue current care  Medication modification as needed  Haldol and cogentin  Disposition planning with social work    Signed By: Shelly Watson MD     August 11, 2020

## 2020-08-11 NOTE — BH NOTES
Received pt at 1 on 8/10/11 - socializing w/ peers, ambulating in hallway, watching tv in day room. Pt completed assessment . VSS. A&O x4. Very pleasant w/ bright affect, cooperative and forthright. Ate PM snack. Pt requested many PRNs throughout the night. Received Tylenol @ 2102, Atarax @ 2102, Trazadone @ 2104, Motrin @ 0431. Pt often c/o of muscle aches, H/A. Tremors have subsided. Mild anxiety present. Denies SI/HI/AH/VH. Pt slept 6.5 hrs on and off w/ broken sleep, getting up often to request PRNs for H/A or muscle cramps. Labs drawn and sent to laboratory.

## 2020-08-11 NOTE — INTERDISCIPLINARY ROUNDS
Behavioral Health Interdisciplinary Rounds Patient Name: Fredis Rodgers Age: 38 y.o. Room/Bed:  310/01 Primary Diagnosis: Schizoaffective disorder ([facility] [address] Admission Status: CV   
Readmission within 30 days: No 
Power of  in place: Yes Patient requires a blocked bed: No          
Reason for blocked bed: N/A Order for blocked bed obtained: N/A Sleep hours: 6.5 Morning Labs completed per orders:  Yes 
Participation in Care/Groups: Yes Medication Compliant?: Yes PRNS (last 24 hours): Tylenol @ 9635, 1333, 2102 Trazadone @ 2104; Motrin @ 1009; 1637 & 3244 Atarax @ 2102; Restraints (last 24 hours): No 
Substance Abuse: No   
24 hour chart check complete:  Yes

## 2020-08-11 NOTE — BH NOTES
Pt is visible on the unit. Somatic. Medication focused. Pt denies si/hi/a/v vo. Attending groups and interacting with peers. Smiling. Pt took scheduled medications. Ate all meals. Requested pain medications to revived ibuprofen 800mg po. Will continue to monitor pt.

## 2020-08-11 NOTE — PROGRESS NOTES
Laboratory monitoring for mood stabilizer and antipsychotics:    Recommended baseline monitoring has been completed based on this patient's current medication regimen. The following labs were completed at transferring facility: CBC, CMP, UDS, BAL, UA, TSH, HCG. Please see transfer paperwork in paper chart/scanned in media tab for details. The patient is currently taking the following medication(s):   Current Facility-Administered Medications   Medication Dose Route Frequency    ziprasidone (GEODON) capsule 20 mg  20 mg Oral BID WITH MEALS    nicotine (NICODERM CQ) 21 mg/24 hr patch 1 Patch  1 Patch TransDERmal DAILY       Height, Weight, BMI Estimation  Estimated body mass index is 26.13 kg/m² as calculated from the following:    Height as of this encounter: 165.1 cm (65\"). Weight as of this encounter: 71.2 kg (157 lb). Renal Function, Hepatic Function and Chemistry  CrCl cannot be calculated (Patient's most recent lab result is older than the maximum 180 days allowed. ). Lab Results   Component Value Date/Time    Sodium 142 11/08/2018 12:52 PM    Potassium 4.3 11/08/2018 12:52 PM    Chloride 104 11/08/2018 12:52 PM    CO2 26 11/08/2018 12:52 PM    Anion gap 8 05/18/2016 02:36 PM    Glucose 79 11/08/2018 12:52 PM    Glucose 89 08/27/2015 05:35 AM    BUN 9 11/08/2018 12:52 PM    Creatinine 0.60 11/08/2018 12:52 PM    BUN/Creatinine ratio 15 11/08/2018 12:52 PM    GFR est AA >60 05/18/2016 02:36 PM    GFR est non-AA >60 05/18/2016 02:36 PM    Calcium 9.1 11/08/2018 12:52 PM    ALT (SGPT) 15 06/08/2016 11:20 AM    Alk.  phosphatase 59 06/08/2016 11:20 AM    Protein, total 6.3 06/08/2016 11:20 AM    Albumin 4.2 06/08/2016 11:20 AM    Globulin 3.0 05/23/2016 05:25 AM    A-G Ratio 2.0 06/08/2016 11:20 AM    Bilirubin, total <0.2 06/08/2016 11:20 AM       Lab Results   Component Value Date/Time    Glucose 79 11/08/2018 12:52 PM    Glucose 89 08/27/2015 05:35 AM       Lab Results   Component Value Date/Time Hemoglobin A1c 5.0 08/11/2020 05:58 AM       Hematology  Lab Results   Component Value Date/Time    WBC 6.5 06/08/2016 11:20 AM    HGB 12.4 06/08/2016 11:20 AM    HCT 37.8 06/08/2016 11:20 AM    PLATELET 284 81/74/2607 11:20 AM    MCV 96 06/08/2016 11:20 AM       Lipids  Lab Results   Component Value Date/Time    Cholesterol, total 153 08/11/2020 05:58 AM    HDL Cholesterol 56 08/11/2020 05:58 AM    LDL, calculated 81.8 08/11/2020 05:58 AM    Triglyceride 76 08/11/2020 05:58 AM    CHOL/HDL Ratio 2.7 08/11/2020 05:58 AM       Thyroid Function    Lab Results   Component Value Date/Time    TSH 1.94 08/25/2015 01:07 PM     Vitals  Visit Vitals  /76 (BP 1 Location: Right arm, BP Patient Position: At rest)   Pulse 76   Temp 97.9 °F (36.6 °C)   Resp 18   Ht 165.1 cm (65\")   Wt 71.2 kg (157 lb)   SpO2 100%   BMI 26.13 kg/m²       Pregnancy Test  Lab Results   Component Value Date/Time    HCG urine, QL NEGATIVE  05/18/2016 04:11 PM    HCG, Ql. NEGATIVE  08/25/2015 01:07 PM       Jennine Holstein, PharmD, BCPS  [phone] (pharmacy)

## 2020-08-11 NOTE — PROGRESS NOTES
Problem: Altered Thought Process (Adult/Pediatric)  Goal: *STG: Participates in treatment plan  Outcome: Progressing Towards Goal     Problem: Altered Thought Process (Adult/Pediatric)  Goal: *STG: Remains safe in hospital  Outcome: Progressing Towards Goal     Problem: Altered Thought Process (Adult/Pediatric)  Goal: *STG: Complies with medication therapy  Outcome: Progressing Towards Goal     Problem: Altered Thought Process (Adult/Pediatric)  Goal: *STG: Decreased delusional thinking  Outcome: Progressing Towards Goal

## 2020-08-11 NOTE — CONSULTS
Hospitalist Admission Note    NAME: Kathrin Jean-Baptiste   :  1980   MRN:  [id number]   Room Number: 075/76  @ [facility]     Date/Time:  2020 12:25 PM    Patient PCP: Laurence Enriquez MD  ______________________________________________________________________  Given the patient's current clinical presentation, I have a high level of concern for decompensation if discharged from the emergency department. Complex decision making was performed, which includes reviewing the patient's available past medical records, laboratory results, and x-ray films. My assessment of this patient's clinical condition and my plan of care is as follows. Assessment / Plan:       Principal Problem:    Schizoaffective disorder ([facility] [address] (2020)        Medical Clearance for psychiatric admission      -Psychiatric treatment and management of health issues,Defer to psychiatrist for further management. -Medically stable at this time. We will follow up on a p.r.n. basis.  -No VTE prophylaxis indicated or warranted at this time. Subjective:   CHIEF COMPLAINT: psychosis    HISTORY OF PRESENT ILLNESS:     Kathrin Jean-Baptiste is a 38 y.o.  female with PMH of polysubstance abuse who presents under TDO from [facility] due to bizarre behavior. She has hx of polysubstance abuse and multiple psych hospitalization. She is non compliant to medications. Reports feeling anxious. Patient denies any past medical history except as listed above. Denies fever,chills,chest pain, sob,abd pan,diarrhea,constipation,lighhadedness. No Hx DM,HTN. No current medical concerns at this time. Past Medical History:   Diagnosis Date    Brain injury [facility])     ?  Fibromyalgia     Marijuana dependence (HCC)     Polysubstance dependence (HCC)     narcotic, stimulants, MJ, benzos    Psychiatric disorder     schizophrenia vs. schizoaffective dis    Seizures ([facility] [address]     ?     Tobacco dependence         Past Surgical History:   Procedure Laterality Date    NEUROLOGICAL PROCEDURE UNLISTED         Social History     Tobacco Use    Smoking status: Former Smoker     Types: Cigarettes   Substance Use Topics    Alcohol use: Yes     Alcohol/week: 0.0 standard drinks        Family History   Problem Relation Age of Onset    Alcohol abuse Father      Allergies   Allergen Reactions    Amoxicillin Rash     Rash and itching    Codeine Itching    Seroquel [Quetiapine] Unknown (comments)        Prior to Admission medications    Medication Sig Start Date End Date Taking? Authorizing Provider   hydrOXYzine pamoate (VistariL) 50 mg capsule Take 50 mg by mouth four (4) times daily as needed for Anxiety. Yes Provider, Historical   ibuprofen (MOTRIN) 600 mg tablet Take 1 Tab by mouth every eight (8) hours as needed for Pain (no more than 2 per day. ). 11/8/18  Yes Rosette Manuel MD   loratadine (CLARITIN) 10 mg tablet Take 1 Tab by mouth daily. 9/25/18  Yes Ron Frank MD   MELATONIN PO Take  by mouth. Provider, Historical   multivitamin (ONE A DAY) tablet Take 1 Tab by mouth daily. Provider, Historical   VALERIAN ROOT PO Take  by mouth. Provider, Historical       REVIEW OF SYSTEMS:        I am not able to complete the review of systems because:    The patient is intubated and sedated    The patient has altered mental status due to his acute medical problems    The patient has baseline aphasia from prior stroke(s)    The patient has baseline dementia and is not reliable historian    The patient is in acute medical distress and unable to provide information           Total of 12 systems reviewed as follows:       POSITIVE= underlined text  Negative = text not underlined  General:  fever, chills, sweats, generalized weakness, weight loss/gain,      loss of appetite   Eyes:    blurred vision, eye pain, loss of vision, double vision  ENT:    rhinorrhea, pharyngitis   Respiratory:   cough, sputum production, SOB, HEARN, wheezing, pleuritic pain   Cardiology:   chest pain, palpitations, orthopnea, PND, edema, syncope   Gastrointestinal:  abdominal pain , N/V, diarrhea, dysphagia, constipation, bleeding   Genitourinary:  frequency, urgency, dysuria, hematuria, incontinence   Muskuloskeletal :  arthralgia, myalgia, back pain  Hematology:  easy bruising, nose or gum bleeding, lymphadenopathy   Dermatological: rash, ulceration, pruritis, color change / jaundice  Endocrine:   hot flashes or polydipsia   Neurological:  headache, dizziness, confusion, focal weakness, paresthesia,     Speech difficulties, memory loss, gait difficulty  Psychological: Feelings of anxiety, depression, agitation    Objective:   VITALS:    Visit Vitals  /76 (BP 1 Location: Right arm, BP Patient Position: At rest)   Pulse 76   Temp 97.9 °F (36.6 °C)   Resp 18   Ht 5' 5\" (1.651 m)   Wt 71.2 kg (157 lb)   SpO2 100%   BMI 26.13 kg/m²       PHYSICAL EXAM:    General:    Alert, cooperative, no distress, appears stated age. HEENT: Atraumatic, anicteric sclerae, pink conjunctivae     No oral ulcers, mucosa moist, throat clear, dentition fair  Neck:  Supple, symmetrical,  no JVD, thyroid: non tender  Lungs:   Clear to auscultation bilaterally. No Wheezing or Rhonchi. No rales. Chest wall:  No tenderness  No Accessory muscle use. Heart:   Regular  rhythm,  No  murmur   No edema  Abdomen:   Soft, non-tender. Not distended. Bowel sounds normal  Extremities: No cyanosis. No clubbing,      Skin turgor normal, Capillary refill normal, Radial dial pulse 2+  Skin:     Not pale. Not Jaundiced  No rashes   Psychiatric:   Mood: stable  Affect: appropriate  Thoughts: logical  Speech: normal  Sensorium: No A/V hallucinations  Safety: no SI/HI    Neurologic: EOMs intact. No facial asymmetry. No aphasia or slurred speech. Symmetrical strength, Sensation grossly intact. Alert and oriented X 4. ______________________________________________________________________    Care Plan discussed with:  Patient/Family    Expected  Disposition: per primary attending   ________________________________________________________________________  TOTAL TIME:  20 Minutes    Critical Care Provided     Minutes non procedure based      Comments     Reviewed previous records   >50% of visit spent in counseling and coordination of care  Discussion with patient and/or family and questions answered       ________________________________________________________________________  Signed: Paulina Narayanan MD    Procedures: see electronic medical records for all procedures/Xrays and details which were not copied into this note but were reviewed prior to creation of Plan.     LAB DATA REVIEWED:    Recent Results (from the past 24 hour(s))   LIPID PANEL    Collection Time: 08/11/20  5:58 AM   Result Value Ref Range    LIPID PROFILE          Cholesterol, total 153 <200 MG/DL    Triglyceride 76 <150 MG/DL    HDL Cholesterol 56 MG/DL    LDL, calculated 81.8 0 - 100 MG/DL    VLDL, calculated 15.2 MG/DL    CHOL/HDL Ratio 2.7 0.0 - 5.0     HEMOGLOBIN A1C WITH EAG    Collection Time: 08/11/20  5:58 AM   Result Value Ref Range    Hemoglobin A1c 5.0 4.0 - 5.6 %    Est. average glucose 97 mg/dL

## 2020-08-12 PROCEDURE — 74011250637 HC RX REV CODE- 250/637: Performed by: PSYCHIATRY & NEUROLOGY

## 2020-08-12 PROCEDURE — 65220000003 HC RM SEMIPRIVATE PSYCH

## 2020-08-12 PROCEDURE — 74011250637 HC RX REV CODE- 250/637: Performed by: NURSE PRACTITIONER

## 2020-08-12 RX ORDER — LANOLIN ALCOHOL/MO/W.PET/CERES
3 CREAM (GRAM) TOPICAL
Status: DISCONTINUED | OUTPATIENT
Start: 2020-08-12 | End: 2020-08-14 | Stop reason: HOSPADM

## 2020-08-12 RX ADMIN — BENZTROPINE MESYLATE 1 MG: 1 TABLET ORAL at 08:57

## 2020-08-12 RX ADMIN — OLANZAPINE 5 MG: 5 TABLET, FILM COATED ORAL at 00:41

## 2020-08-12 RX ADMIN — HALOPERIDOL 5 MG: 5 TABLET ORAL at 17:17

## 2020-08-12 RX ADMIN — HALOPERIDOL 5 MG: 5 TABLET ORAL at 08:57

## 2020-08-12 RX ADMIN — ACETAMINOPHEN 650 MG: 325 TABLET, FILM COATED ORAL at 17:19

## 2020-08-12 RX ADMIN — MELATONIN 3 MG: at 21:09

## 2020-08-12 RX ADMIN — BENZTROPINE MESYLATE 1 MG: 1 TABLET ORAL at 00:41

## 2020-08-12 RX ADMIN — IBUPROFEN 800 MG: 400 TABLET ORAL at 00:01

## 2020-08-12 RX ADMIN — BENZTROPINE MESYLATE 1 MG: 1 TABLET ORAL at 17:17

## 2020-08-12 RX ADMIN — IBUPROFEN 800 MG: 400 TABLET ORAL at 21:08

## 2020-08-12 RX ADMIN — IBUPROFEN 800 MG: 400 TABLET ORAL at 14:32

## 2020-08-12 NOTE — BH NOTES
Behavioral Health Treatment Team Note     Patient goal(s) for today: Take medication as prescribed  Treatment team focus/goals: Adjust medications as needed, attend group    Progress note: Patient denies SI, HI, and hallucinations. She reports dry mouth from the Zyprexa and reports saliva replacement does not help. She reports liking the haldol and cogentin combo best. She reports confused thoughts and being easily distracted and is asking about Vyvance like medications. She was encouraged to talk to her OP provider about this. She was focused during discussion today and shared her previous experience with [facility] location and that this is a possible option upon discharge.     LOS:  4  Expected LOS: TBD    Insurance info/prescription coverage:  [place] Medicare Part A&B and TDO  Date of last family contact:  No KAYCEE at this time  Family requesting physician contact today:  no  Discharge plan:  Return home  Guns in the home:  no   Outpatient provider(s):  To be linked- [facility] office     Participating treatment team members: Dr Alon Combs MD,

## 2020-08-12 NOTE — PROGRESS NOTES
Problem: Falls - Risk of  Goal: *Absence of Falls  Description: Document Suni Ruts Fall Risk and appropriate interventions in the flowsheet.   Outcome: Progressing Towards Goal  Note: Fall Risk Interventions:

## 2020-08-12 NOTE — INTERDISCIPLINARY ROUNDS
Behavioral Health Interdisciplinary Rounds Patient Name: Akila Enriquez Age: 38 y.o. Room/Bed:  310/01 Primary Diagnosis: Schizoaffective disorder ([facility] [address] Admission Status: CV                       
Readmission within 30 days: No 
Power of  in place: Yes Patient requires a blocked bed: No          
Reason for blocked bed: N/A Order for blocked bed obtained: N/A   
  
Sleep hours: 6 Morning Labs completed per orders:  N/A Participation in Care/Groups: Yes Medication Compliant?: Yes PRNS (last 24 hours): Trazadone, Atarax, Tylenol @ 2113. Zyprexa & Cogentin @ 0041. Motrin @ 0001 Restraints (last 24 hours): No 
Substance Abuse: No              
24 hour chart check complete:  Yes

## 2020-08-12 NOTE — BH NOTES
Received pt ambulating in hallway socializing with peers. Was in day room watching tv. VSS. Complied w/ PM meds. Ate PM snack. Pt mood is much brighter, smiling, euthymic affect. Pt has been more optimistic and more focused w/ logical conversation. She denies SI/HI/AH/VH. She still c/o mild withdrawal Sx to include generalized body aches, muscle aches and occasional H/A. She asks for PRNs often and received Trazadone, Tylenol and Atarax @ 2113. Woke up w/ H/A and received Ibuprofen @ 0001. Pt wasn't able to go back to sleep at 0030 and was irritable and aggravated that she couldn't sleep anymore - was given Zyprexa and Cogentin. Pt fell asleep after this and slept soundly for 6 hrs.

## 2020-08-12 NOTE — BH NOTES
Patient visible this morning in the dayroom, interacting with other patients. She denies SI/HI and A/V hallucinations. Complains of mild back pain. She is happy and smiling, can have focused conversations with this writer with some delusions, stating he personality can jump into other people. Calm and cooperative, med and meal compliant.  Continue Q 15 min rounds

## 2020-08-13 PROCEDURE — 74011250637 HC RX REV CODE- 250/637: Performed by: NURSE PRACTITIONER

## 2020-08-13 PROCEDURE — 65220000003 HC RM SEMIPRIVATE PSYCH

## 2020-08-13 PROCEDURE — 74011250637 HC RX REV CODE- 250/637: Performed by: PSYCHIATRY & NEUROLOGY

## 2020-08-13 RX ADMIN — MELATONIN 3 MG: at 21:14

## 2020-08-13 RX ADMIN — HALOPERIDOL 5 MG: 5 TABLET ORAL at 07:23

## 2020-08-13 RX ADMIN — HYDROXYZINE HYDROCHLORIDE 50 MG: 25 TABLET, FILM COATED ORAL at 14:37

## 2020-08-13 RX ADMIN — BENZTROPINE MESYLATE 1 MG: 1 TABLET ORAL at 17:03

## 2020-08-13 RX ADMIN — OLANZAPINE 5 MG: 5 TABLET, FILM COATED ORAL at 14:37

## 2020-08-13 RX ADMIN — BENZTROPINE MESYLATE 1 MG: 1 TABLET ORAL at 07:23

## 2020-08-13 RX ADMIN — IBUPROFEN 800 MG: 400 TABLET ORAL at 08:53

## 2020-08-13 RX ADMIN — HALOPERIDOL 5 MG: 5 TABLET ORAL at 17:03

## 2020-08-13 RX ADMIN — IBUPROFEN 800 MG: 400 TABLET ORAL at 21:14

## 2020-08-13 NOTE — BH NOTES
Assumed care for the patient following day shift at [address] [place] [address]. Patient presents as calm and hopeful. Patient stated \"I had a great day today and I am working on a puzzle right now. \" Patient was observed interacting well with other patients in the day room and is cooperative with staff. Patient did request to have an additional dose of their melatonin at bedtime but was able to fall asleep while only taking the 3mg tab. Patient does not have any other questions/concerns regarding their treatment at this time. Patient denies any S/I, H/I, and AVH. Patient slept a total of 8 hours.

## 2020-08-13 NOTE — PROGRESS NOTES
Diet as tolerated. Pt meal compliant. Hx notable for polysubstance dependence, non-compliance.     Ht: 5'5\"  Wt: 157 lb  BMI: 26.13 kg/(m^2) c/w overweight  Est energy needs: 1715 kcal, 64 g protein, 1 mL/kcal fluids  Pt will consume > 75% of meals at follow up 7-10 days  LOS

## 2020-08-13 NOTE — BH NOTES
Behavioral Health Interdisciplinary Rounds     Patient Name: Suki Alfaro  Age: 38 y.o. Room/Bed:  310/01  Primary Diagnosis: Schizoaffective disorder ([facility] [address]   Admission Status:  CV    Readmission within 30 days: No  Power of  in place: Yes  Patient requires a blocked bed: No            Reason for blocked bed: N/A  Order for blocked bed obtained: No     Sleep hours:  8   Morning Labs completed per orders:  N/A      Participation in Care/Groups:  Yes  Medication Compliant?: Yes  PRNS (last 24 hours): Tylenol 650mg, Motrin 800mg x2.       Restraints (last 24 hours):  No  Substance Abuse:  No    24 hour chart check complete:

## 2020-08-13 NOTE — PROGRESS NOTES
Problem: Altered Thought Process (Adult/Pediatric)  Goal: *STG: Remains safe in hospital  8/13/2020 0832 by Bruno Roach RN  Outcome: Progressing Towards Goal  8/13/2020 0727 by Bruno Roach RN  Outcome: Progressing Towards Goal  Goal: *STG: Complies with medication therapy  8/13/2020 0832 by Bruno Roach RN  Outcome: Progressing Towards Goal  8/13/2020 0727 by Bruno Roach RN  Outcome: Progressing Towards Goal

## 2020-08-13 NOTE — BH NOTES
Behavioral Health Treatment Team Note     Patient goal(s) for today: take medication as prescribed, attend group  Treatment team focus/goals: adjust medication as needed, discharge planning    Progress note: Apolinar Post shared she slept good last night and was drooling she slept so hard. She reports feeling good today and denies SI, HI, and hallucinations. She reports not feeling as foggy today as yesterday though she reports feeling kind of \"far off\". She is discharge focused and would like to go home before the weekend. Patient is agreeable to restarting services with [facility] if she is not open.     LOS:  5  Expected LOS: 6    Insurance info/prescription coverage:  [place] Medicare Part A&B and TDO  Date of last family contact:  No KAYCEE at this time  Family requesting physician contact today:  no  Discharge plan:  Return home  Guns in the home:  no   Outpatient provider(s):  To be linked- [facility] office as she is not currently open to [facility], she was terminated from [facility]  Participating treatment team members: Dena Vargas, Dr Conrad Zaldivar MD, Paul Thompson PharmD

## 2020-08-14 VITALS
RESPIRATION RATE: 18 BRPM | DIASTOLIC BLOOD PRESSURE: 68 MMHG | HEART RATE: 68 BPM | TEMPERATURE: 98.1 F | SYSTOLIC BLOOD PRESSURE: 111 MMHG | BODY MASS INDEX: 26.16 KG/M2 | HEIGHT: 65 IN | OXYGEN SATURATION: 100 % | WEIGHT: 157 LBS

## 2020-08-14 PROCEDURE — 74011250637 HC RX REV CODE- 250/637: Performed by: PSYCHIATRY & NEUROLOGY

## 2020-08-14 RX ORDER — LANOLIN ALCOHOL/MO/W.PET/CERES
3 CREAM (GRAM) TOPICAL
Qty: 30 TAB | Refills: 0 | Status: SHIPPED | OUTPATIENT
Start: 2020-08-14

## 2020-08-14 RX ORDER — IBUPROFEN 800 MG/1
800 TABLET ORAL
Qty: 90 TAB | Refills: 0 | Status: SHIPPED | OUTPATIENT
Start: 2020-08-14

## 2020-08-14 RX ORDER — HALOPERIDOL 5 MG/1
5 TABLET ORAL 2 TIMES DAILY
Qty: 60 TAB | Refills: 0 | Status: SHIPPED | OUTPATIENT
Start: 2020-08-14

## 2020-08-14 RX ADMIN — BENZTROPINE MESYLATE 1 MG: 1 TABLET ORAL at 07:48

## 2020-08-14 RX ADMIN — IBUPROFEN 800 MG: 400 TABLET ORAL at 09:13

## 2020-08-14 RX ADMIN — HALOPERIDOL 5 MG: 5 TABLET ORAL at 07:47

## 2020-08-14 NOTE — BH NOTES
Behavioral Health Interdisciplinary Rounds      Patient Name: Mali Yoo                       Age: 38 y.o. Room/Bed:  310/01  Primary Diagnosis: Schizoaffective disorder ([facility] [address]         Admission Status:       CV                     Readmission within 30 days: No  Power of  in place: Yes  Patient requires a blocked bed: No            Reason for blocked bed: N/A  Order for blocked bed obtained: No     Sleep hours:   8    Morning Labs completed per orders:  N/A                    Participation in Care/Groups:  Yes  Medication Compliant?: Yes  PRNS (last 24 hours): Zyprexa 5mg, Motrin 800mg x2, atarax 50mg PO                   Restraints (last 24 hours):  No  Substance Abuse:   No                          24 hour chart check complete:

## 2020-08-14 NOTE — BH NOTES
Assumed care for the patient following day shift at [address] [place] [address]. Patient presents as pleasant and cooperative. Patient was visible on the unit and interacts well with others. Patient does not have questions about their treatment at this time. Patient denies any S/I, H/I, and AVH. Patient slept a total of 8 hours.

## 2020-08-14 NOTE — DISCHARGE INSTRUCTIONS
Patient Education   If I feel I am at risk of hurting myself or others, I will call the crisis office and speak with a crisis worker who will assist me during my crisis. [address]  [phone]  [address] [place] [address] [phone]  [address] [place] [address]  [phone]         Learning About Schizoaffective Disorder  What is schizoaffective disorder? Schizoaffective (say \"wahj-gj-en-FECK-tiv\") disorder is a complex mental illness. People who have it have the symptoms of both schizophrenia and a mood disorder. The disorder affects how clearly you can think. It can also make it hard to manage your emotions and connect with others. And it affects how happy or sad you feel. What causes it? Experts don't know what causes schizoaffective disorder. It may have different causes for different people. It's not caused by anything you did or how your parents raised you. And it's not a sign of weakness. What are the symptoms? The symptoms of schizoaffective disorder are the same as those of schizophrenia and a mood disorder. People with schizoaffective disorder may have many of these symptoms. Schizophrenia symptoms include:  · Having hallucinations. This means that you see or hear things that aren't really there. · Having delusions. These are beliefs that aren't real.  · Having a hard time feeling and showing emotion. Mood disorder symptoms include:  · Depression. · Feeling extremely happy or having lots of energy. How is it diagnosed? Your doctor or mental health professional usually can tell if you have schizoaffective disorder by talking with you. He or she will look at the order and timing of your symptoms and how long your symptoms last.  Your doctor will ask you about other things too.  These may include questions about:  · Any odd experiences you may have had, such as hearing voices or having confusing thoughts. · Your feelings. · Any changes in eating habits, energy level, and interest in daily tasks. · How well you are sleeping. · If you can focus on the things you do. How is it treated? Finding out that you have schizoaffective disorder can be scary and hard to deal with. But the disorder can be treated. The goal of treatment is to lower your stress and help your brain work as it should. Ongoing treatment can keep the disorder under control. Treatment includes medicines and counseling. Medicines help your symptoms. It's important to take your medicines on schedule to keep your moods even. When you feel good, you may think that you don't need them. But it is important to keep taking them. Counseling helps you change how you think about things. It can also help you cope with the illness. You will work with a mental health professional. This may be a psychologist, a licensed professional counselor, a clinical , or a psychiatrist.  Follow-up care is a key part of your treatment and safety. Be sure to make and go to all appointments, and call your doctor if you are having problems. It's also a good idea to know your test results and keep a list of the medicines you take. Where can you learn more? Go to http://curtis-cate.info/  Enter A016 in the search box to learn more about \"Learning About Schizoaffective Disorder. \"  Current as of: January 31, 2020               Content Version: 12.5  © 1223-4888 [employer]. Care instructions adapted under license by [employer] (which disclaims liability or warranty for this information). If you have questions about a medical condition or this instruction, always ask your healthcare professional. [address] any warranty or liability for your use of this information.
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
## 2020-08-14 NOTE — BH NOTES
Behavioral Health Transition Record to Provider    Patient Name: Robin Gold  YOB: 1980  Medical Record Number: [id number]  Date of Admission: 8/8/2020  Date of Discharge: 8/14/2020    Attending Provider: Paul Tomas MD  Discharging Provider: Paul Tomas MD  To contact this individual call [phone] and ask the  to page. If unavailable, ask to be transferred to [place] Provider on call. [facility] Provider will be available on call 24/7 and during holidays. Primary Care Provider: Lillian Rosenbaum MD    Allergies   Allergen Reactions    Amoxicillin Rash     Rash and itching    Codeine Itching    Seroquel [Quetiapine] Unknown (comments)       Reason for Admission: 38 y.o. WHITE Carmine Lafleur is currently admitted to the psychiatric floor at [facility]. She was transferred from [facility] as a TDO due to disorganized and bizarre behavior. She appears somewhat confused, preoccupied upon assessment. She is a poor historian. She does reports previous admissions into the hospital though is unable to provide reason and/or dates. She endorses paranoia and is suspicious of treatment. She is requesting a benzo to help her \"calm down\". García Flores reports a h/o medication and treatment noncompliance. She admits to \"probably acting bizarre\" and poor hygiene. She reports that she wants to be restarted on her medications.     Admission Diagnosis: [facility]ffective disorder ([facility] [F25.9]    * No surgery found *    Results for orders placed or performed during the hospital encounter of 08/08/20   LIPID PANEL   Result Value Ref Range    LIPID PROFILE          Cholesterol, total 153 <200 MG/DL    Triglyceride 76 <150 MG/DL    HDL Cholesterol 56 MG/DL    LDL, calculated 81.8 0 - 100 MG/DL    VLDL, calculated 15.2 MG/DL    CHOL/HDL Ratio 2.7 0.0 - 5.0     HEMOGLOBIN A1C WITH EAG   Result Value Ref Range    Hemoglobin A1c 5.0 4.0 - 5.6 %    Est. average glucose 97 mg/dL       Immunizations administered during this encounter:   Immunization History   Administered Date(s) Administered    Influenza Vaccine (Quad) PF 2018       Screening for Metabolic Disorders for Patients on Antipsychotic Medications  (Data obtained from the EMR)    Estimated Body Mass Index  Estimated body mass index is 26.13 kg/m² as calculated from the following:    Height as of this encounter: 5' 5\" (1.651 m). Weight as of this encounter: 71.2 kg (157 lb). Vital Signs/Blood Pressure  Visit Vitals  /68   Pulse 68   Temp 98.1 °F (36.7 °C)   Resp 18   Ht 5' 5\" (1.651 m)   Wt 71.2 kg (157 lb)   SpO2 100%   BMI 26.13 kg/m²       Blood Glucose/Hemoglobin A1c  Lab Results   Component Value Date/Time    Glucose 79 2018 12:52 PM    Glucose 89 2015 05:35 AM       Lab Results   Component Value Date/Time    Hemoglobin A1c 5.0 2020 05:58 AM        Lipid Panel  Lab Results   Component Value Date/Time    Cholesterol, total 153 2020 05:58 AM    HDL Cholesterol 56 2020 05:58 AM    LDL, calculated 81.8 2020 05:58 AM    Triglyceride 76 2020 05:58 AM    CHOL/HDL Ratio 2.7 2020 05:58 AM        Discharge Diagnosis: Schizoaffective Disorder    Discharge Plan: Return home with follow up at [address] [facility]  : 1980  MRN: [id number]    The patient Fredis Settler exhibits the ability to control behavior in a less restrictive environment. Patient's level of functioning is improving. No assaultive/destructive behavior has been observed for the past 24 hours. No suicidal/homicidal threat or behavior has been observed for the past 24 hours. There is no evidence of serious medication side effects. Patient has not been in physical or protective restraints for at least the past 24 hours. If weapons involved, how are they secured? NA    Is patient aware of and in agreement with discharge plan?  Yes    Arrangements for medication:  Prescriptions e-scribed to patient, given a 30 day supply. Copy of discharge instructions to provider?:  Yes    Arrangements for transportation home:  [place]    Keep all follow up appointments as scheduled, continue to take prescribed medications per physician instructions. Mental health crisis number:  346 or your local mental health crisis line number at [phone]        Discharge Medication List and Instructions:   Current Discharge Medication List      START taking these medications    Details   haloperidoL (HALDOL) 5 mg tablet Take 1 Tab by mouth two (2) times a day. Indications: Schizoaffective disorder  Qty: 60 Tab, Refills: 0         CONTINUE these medications which have CHANGED    Details   ibuprofen (MOTRIN) 800 mg tablet Take 1 Tab by mouth three (3) times daily as needed for Pain. Indications: pain  Qty: 90 Tab, Refills: 0      melatonin 3 mg tablet Take 1 Tab by mouth nightly. Indications: Insomnia  Qty: 30 Tab, Refills: 0         CONTINUE these medications which have NOT CHANGED    Details   hydrOXYzine pamoate (VistariL) 50 mg capsule Take 50 mg by mouth four (4) times daily as needed for Anxiety. loratadine (CLARITIN) 10 mg tablet Take 1 Tab by mouth daily. Qty: 30 Tab, Refills: 5    Associated Diagnoses: Cough; Seasonal allergic rhinitis, unspecified trigger      multivitamin (ONE A DAY) tablet Take 1 Tab by mouth daily. STOP taking these medications       VALERIAN ROOT PO Comments:   Reason for Stopping:               Unresulted Labs (24h ago, onward)    None        To obtain results of studies pending at discharge, please contact [phone]    Follow-up Information     Follow up With Specialties Details Why Contact Info    [address]  Call on 8/14/2020 Please call Rapid Access phone line [phone] between 8:30AM -5:00PM to complete phone assessment for ongoing mental health case management, therapy and medication management.   [address] [facility] [address] [facility]  [phone]    Sade Gutierrez MD Pediatric Medicine, Internal Medicine   [address] [facility] [address]  [address] [place] [id number]      [address] on 8/20/2020 Please [facility] up with Dr Ry Cruz [facility] on 8/20 [facility]  [address] [facility]  [phone]          Advanced Directive:   Does the patient have an appointed surrogate decision maker? No  Does the patient have a Medical Advance Directive? No  Does the patient have a Psychiatric Advance Directive? No  If the patient does not have a surrogate or Medical Advance Directive AND Psychiatric Advance Directive, the patient was offered information on these advance directives Patient will complete at a later time    Patient Instructions: Please continue all medications until otherwise directed by physician. Tobacco Cessation Discharge Plan:   Is the patient a smoker and needs referral for smoking cessation? Not applicable  Patient referred to the following for smoking cessation with an appointment? Not applicable     Patient was offered medication to assist with smoking cessation at discharge? Not applicable  Was education for smoking cessation added to the discharge instructions? Not applicable    Alcohol/Substance Abuse Discharge Plan:   Does the patient have a history of substance/alcohol abuse and requires a referral for treatment? Not applicable  Patient referred to the following for substance/alcohol abuse treatment with an appointment? Not applicable  Patient was offered medication to assist with alcohol cessation at discharge? Not applicable  Was education for substance/alcohol abuse added to discharge instructions? Not applicable    Patient discharged to Home; discussed with patient/caregiver and provided to the patient/caregiver either in hard copy or electronically.

## 2020-08-14 NOTE — BH NOTES
Pt medicated with Motrin for back pain rated 7/10. States that the pain goes down the back of her legs to her heel.

## 2020-08-14 NOTE — BH NOTES
0715-Report received from off going nurse. 0800-resident denies pain and discomfort, she also denies al SI./HI, AVH. Anxiety and depression. Breakfast completed in dayroom. Will continue to monitor. 0900-there are no noted issues at this time. 1100-Resident is resting in room with no issues to note at this time. 1250-Resident discharged with all belongings, AVS, medication admin information, and crisis intervention contact information. She voiced an understanding of all instructions. There are no issues to note at this time.

## 2021-04-05 NOTE — GROUP NOTE
[facility] GROUP DOCUMENTATION INDIVIDUAL Group Therapy Note Date: 8/9/2020 Group Start Time: 0919 Group End Time: 2230 Group Topic: Social Work Group [facility] Amandeep Donmj [facility] GROUP DOCUMENTATION GROUP Group Therapy Note Attendees: 2 Attendance: Attended Patient's Goal:  Patient will complete Values Clarification exercise and be able to identify and process their personal values and how that is evidenced by their own behaviors. Patient will be able to identify values that are an opportunity for growth and those that they aspire to increase in their lives. Interventions/techniques: Challenged, Informed, Validated and Provide feedback Follows Directions: Followed directions Interactions: Interacted appropriately Mental Status: Congruent and Elevated Behavior/appearance: Attentive and Cooperative Goals Achieved: Able to engage in interactions and Able to listen to others Additional Notes:  Patient had a hard time narrowing down which of the values were the most important to her, and stated that many of them have the same qualities. Patient stated that she valued her spirituality and stated \"that should be first for me. I'm working on that. \" Hettie Certain
JOANNA  GROUP DOCUMENTATION INDIVIDUAL Group Therapy Note Date: 8/10/2020 Group Start Time: 1100 Group End Time: 1200 Group Topic: Topic Group [facility] [place] 3 ACUTE BEHAV [facility] [place], [address] GROUP DOCUMENTATION GROUP Group Therapy Note Attendees: 6 Attendance: Attended Patient's Goal:  To participate in self esteem booster Interventions/techniques: Supported-handout People With Mental Illness Enrich Our Lives Follows Directions: Followed directions Interactions: minimal 
 
Mental Status: Calm and Flat Behavior/appearance: Attentive, Cooperative and Needed prompting Goals Achieved: Able to engage in interactions, Able to listen to others, Discussed coping and Discussed self-esteem issues Additional Notes:  Pleasant-participated in discussion with prompts Mily Ramos
JOANNA  GROUP DOCUMENTATION INDIVIDUAL Group Therapy Note Date: 8/10/2020 Group Start Time: 1500 Group End Time: 5462 Group Topic: Recreational/Music Therapy [facility] [place] [address] ACUTE BEHAV [facility] [place], [address] GROUP DOCUMENTATION GROUP Group Therapy Note Attendees: 9 Attendance: Attended Patient's Goal:  To concentrate on selected task Interventions/techniques: Supported-crafts,games,music Follows Directions: Followed directions Interactions: Interacted appropriately Mental Status: Calm and Flat Behavior/appearance: Cooperative and Needed prompting Goals Achieved: Able to engage in interactions and Able to listen to others Additional Notes:  Pleasant upon approach-completed task with encouragement Kenny Press
JOANNA  GROUP DOCUMENTATION INDIVIDUAL Group Therapy Note Date: 8/11/2020 Group Start Time: 1100 Group End Time: 1200 Group Topic: Topic Group [facility] [place] 3 ACUTE BEHAV [facility] [place], [address] GROUP DOCUMENTATION GROUP Group Therapy Note Attendees: 6 Attendance: attended Patient's Goal:  To identify people and things most grateful for Interventions/techniques: Supported-gratitude list 
 
Follows Directions: Followed directions Interactions: Interacted appropriately Mental Status: Calm Behavior/appearance: Attentive, Cooperative and Needed prompting Goals Achieved: Able to engage in interactions, Able to listen to others, Able to self-disclose, Discussed coping and Discussed self-esteem issues Additional Notes:  Pt offered feedback during discussion with encouragement. Smiling-bright affect during session Mike Hughes
JOANNA  GROUP DOCUMENTATION INDIVIDUAL Group Therapy Note Date: 8/11/2020 Group Start Time: 1500 Group End Time: 0577 Group Topic: Recreational/Music Therapy [facility] [place] [address] ACUTE BEHAV [facility] [place], [address] GROUP DOCUMENTATION GROUP Group Therapy Note Attendees: 8 Attendance: Attended Patient's Goal:  To concentrate on selected task Interventions/techniques: Supported-crafts,games,music Follows Directions: Followed directions Interactions: Interacted appropriately Mental Status: Calm Behavior/appearance: Attentive, Cooperative and Needed prompting Goals Achieved: Able to engage in interactions and Able to listen to others Additional Notes: Actively participated with encouragement Raúl Watson
JOANNA  GROUP DOCUMENTATION INDIVIDUAL Group Therapy Note Date: 8/12/2020 Group Start Time: 1100 Group End Time: 1200 Group Topic: Topic Group [facility] [place] 3 ACUTE BEHAV [facility], [place] [address] GROUP DOCUMENTATION GROUP Group Therapy Note Attendees: 7 Attendance: Attended Patient's Goal:  To identify positive and negative coping skills Interventions/techniques: Supported-worksheet Follows Directions: Followed directions Interactions: minimal 
 
Mental Status: Calm and Flat Behavior/appearance: Cooperative and Needed prompting Goals Achieved: Able to engage in interactions, Able to listen to others, Able to self-disclose and Discussed coping Additional Notes:  Guarded at times,did offer relevant feedback during discussion with prompting Bari Boston
JOANNA  GROUP DOCUMENTATION INDIVIDUAL Group Therapy Note Date: 8/12/2020 Group Start Time: 1500 Group End Time: 2119 Group Topic: Recreational/Music Therapy [facility] 3 ACUTE BEHAV [facility] [place], [address] [facility] GROUP DOCUMENTATION GROUP Group Therapy Note Attendees: 7 Attendance: Attended Patient's Goal:  To concentrate on selected task Interventions/techniques: Supported-crafts,games,music Follows Directions: Followed directions Interactions: Interacted appropriately Mental Status: Calm Behavior/appearance: Attentive, Cooperative and Needed prompting Goals Achieved: Able to engage in interactions and Able to listen to others Additional Notes:  Smiling/laughing while participating in game Sherylines Gutierrezilan
JOANNA  GROUP DOCUMENTATION INDIVIDUAL Group Therapy Note Date: 8/13/2020 Group Start Time: 1100 Group End Time: 1200 Group Topic: Topic Group [facility] [place] 3 ACUTE BEHAV [facility], [address] GROUP DOCUMENTATION GROUP Group Therapy Note Attendees: 5 Attendance: Attended Patient's Goal:  To participate in anger management [employer] game Interventions/techniques: Supported-things pertaining to anger Follows Directions: Followed directions Interactions: Interacted appropriately Mental Status: Calm Behavior/appearance: Attentive, Cooperative and Needed prompting Goals Achieved: Able to engage in interactions, Able to listen to others, Discussed coping and Identified triggers Additional Notes:   
 
Sanam Rock
JOANNA  GROUP DOCUMENTATION INDIVIDUAL Group Therapy Note Date: 8/13/2020 Group Start Time: 1500 Group End Time: 4224 Group Topic: Recreational/Music Therapy [facility] [place] [address] ACUTE BEHAV [facility] [place], [address] GROUP DOCUMENTATION GROUP Group Therapy Note Attendees: 7 Attendance: Attended Patient's Goal:  To concentrate on selected task Interventions/techniques: Supported-crafts,games,music Follows Directions: Followed directions Interactions: Interacted appropriately Mental Status: Calm Behavior/appearance: Attentive and Cooperative Goals Achieved: Able to engage in interactions and Able to listen to others Additional Notes:  Completed task Av [place]
yes

## 2022-01-08 NOTE — TELEPHONE ENCOUNTER
Declined  to refill cyclobenzaprine as discussed in office.    Anthony Puentes MD
Alert and oriented, no focal deficits, no motor or sensory deficits.

## 2022-10-25 NOTE — BH NOTES
Psychiatric Progress Note    Patient: Akila Enriquez MRN: [id number]  SSN: [id number]    YOB: 1980  Age: 38 y.o. Sex: female      Admit Date: 8/8/2020    LOS: 4 days     Subjective:     Akila Enriquez  reports feeling restless and moods are fair. Notes pacing and has a migraine. Denies SI/HI/AH/VH. No aggression or violence. Appropriately interactive and aware. Tolerating medications well. Eating fairly and sleeping alright.    8/11 - Akila Enriquez reports seeing color blots with Geodon and better tolerated Risperdal and haldol. Moods are fair. Denies SI/HI/AH/VH. No aggression or violence. Appropriately interactive and aware. Tolerating medications well. Eating and sleeping fairly. Notes trouble focusing her thoughts. Notes dryness with Zyprexa, took Adderall and Vyvanse historically. 8/12 - Akila Enriquez reports feeling strange with zyprexa and discussed her use of vyvanse and adderall for focus and concentration. Moods are fair. Denies SI/HI/AH/VH. No aggression or violence. Appropriately interactive and aware. Tolerating medications well. Eating ok and sleeping poorly. Complaining of constipation but prefers prune juice or metamucil treatments.     Objective:     Vitals:    08/10/20 2009 08/11/20 0747 08/11/20 1940 08/12/20 0742   BP: 121/82 114/76 134/82 111/79   Pulse: 85 76 88 85   Resp: 18 18 18 18   Temp: 98.1 °F (36.7 °C) 97.9 °F (36.6 °C) 98.4 °F (36.9 °C) 98 °F (36.7 °C)   SpO2: 99% 100% 98% 97%   Weight:       Height:            Mental Status Exam:   Sensorium  oriented to time, place and person   Relations cooperative   Eye Contact appropriate   Appearance:  age appropriate, sauntering   Speech:  normal volume and non-pressured   Thought Process: goal directed   Thought Content free of delusions and free of hallucinations   Suicidal ideations none   Mood:  anxious   Affect:  constricted   Memory   adequate   Concentration:  adequate   Insight:  fair   Judgment: impaired due to condition       MEDICATIONS:  Current Facility-Administered Medications   Medication Dose Route Frequency    haloperidoL (HALDOL) tablet 5 mg  5 mg Oral BID    benztropine (COGENTIN) tablet 1 mg  1 mg Oral BID    ibuprofen (MOTRIN) tablet 800 mg  800 mg Oral TID PRN    OLANZapine (ZyPREXA) tablet 5 mg  5 mg Oral Q6H PRN    haloperidol lactate (HALDOL) injection 5 mg  5 mg IntraMUSCular Q6H PRN    benztropine (COGENTIN) tablet 1 mg  1 mg Oral BID PRN    diphenhydrAMINE (BENADRYL) injection 50 mg  50 mg IntraMUSCular BID PRN    hydrOXYzine HCL (ATARAX) tablet 50 mg  50 mg Oral TID PRN    LORazepam (ATIVAN) injection 1 mg  1 mg IntraMUSCular Q4H PRN    traZODone (DESYREL) tablet 50 mg  50 mg Oral QHS PRN    acetaminophen (TYLENOL) tablet 650 mg  650 mg Oral Q4H PRN    magnesium hydroxide (MILK OF MAGNESIA) 400 mg/5 mL oral suspension 30 mL  30 mL Oral DAILY PRN    nicotine (NICODERM CQ) 21 mg/24 hr patch 1 Patch  1 Patch TransDERmal DAILY      DISCUSSION:   the risks and benefits of the proposed medication  patient given opportunity to ask questions    Lab/Data Review: All lab results for the last 24 hours reviewed. No results found for this or any previous visit (from the past 24 hour(s)).       Assessment:     Principal Problem:    Schizoaffective disorder ([facility] [address] (8/9/2020)        Plan:     Continue current care  Medication modification as needed  Haldol and cogentin  Prune juice with meals  Melatonin for sleep  Disposition planning with social work    Signed By: Yossi Roy MD     August 12, 2020 [Joint Pain] : joint pain [Negative] : Heme/Lymph